# Patient Record
Sex: FEMALE | Race: BLACK OR AFRICAN AMERICAN | NOT HISPANIC OR LATINO | URBAN - METROPOLITAN AREA
[De-identification: names, ages, dates, MRNs, and addresses within clinical notes are randomized per-mention and may not be internally consistent; named-entity substitution may affect disease eponyms.]

---

## 2023-09-21 ENCOUNTER — EMERGENCY (EMERGENCY)
Facility: HOSPITAL | Age: 53
LOS: 1 days | Discharge: HOME | End: 2023-09-21
Attending: EMERGENCY MEDICINE | Admitting: EMERGENCY MEDICINE
Payer: MEDICAID

## 2023-09-21 VITALS
RESPIRATION RATE: 18 BRPM | TEMPERATURE: 99 F | HEART RATE: 88 BPM | OXYGEN SATURATION: 100 % | DIASTOLIC BLOOD PRESSURE: 92 MMHG | SYSTOLIC BLOOD PRESSURE: 154 MMHG

## 2023-09-21 LAB
ALBUMIN SERPL ELPH-MCNC: 4.7 G/DL — SIGNIFICANT CHANGE UP (ref 3.3–5)
ALP SERPL-CCNC: 69 U/L — SIGNIFICANT CHANGE UP (ref 40–120)
ALT FLD-CCNC: 15 U/L — SIGNIFICANT CHANGE UP (ref 4–33)
ANION GAP SERPL CALC-SCNC: 12 MMOL/L — SIGNIFICANT CHANGE UP (ref 7–14)
AST SERPL-CCNC: 18 U/L — SIGNIFICANT CHANGE UP (ref 4–32)
BASOPHILS # BLD AUTO: 0.08 K/UL — SIGNIFICANT CHANGE UP (ref 0–0.2)
BASOPHILS NFR BLD AUTO: 0.9 % — SIGNIFICANT CHANGE UP (ref 0–2)
BILIRUB SERPL-MCNC: 0.4 MG/DL — SIGNIFICANT CHANGE UP (ref 0.2–1.2)
BUN SERPL-MCNC: 8 MG/DL — SIGNIFICANT CHANGE UP (ref 7–23)
CALCIUM SERPL-MCNC: 9.8 MG/DL — SIGNIFICANT CHANGE UP (ref 8.4–10.5)
CHLORIDE SERPL-SCNC: 100 MMOL/L — SIGNIFICANT CHANGE UP (ref 98–107)
CO2 SERPL-SCNC: 28 MMOL/L — SIGNIFICANT CHANGE UP (ref 22–31)
CREAT SERPL-MCNC: 0.69 MG/DL — SIGNIFICANT CHANGE UP (ref 0.5–1.3)
EGFR: 105 ML/MIN/1.73M2 — SIGNIFICANT CHANGE UP
EOSINOPHIL # BLD AUTO: 0.14 K/UL — SIGNIFICANT CHANGE UP (ref 0–0.5)
EOSINOPHIL NFR BLD AUTO: 1.7 % — SIGNIFICANT CHANGE UP (ref 0–6)
GIANT PLATELETS BLD QL SMEAR: PRESENT — SIGNIFICANT CHANGE UP
GLUCOSE SERPL-MCNC: 83 MG/DL — SIGNIFICANT CHANGE UP (ref 70–99)
HCG SERPL-ACNC: <1 MIU/ML — SIGNIFICANT CHANGE UP
HCT VFR BLD CALC: 36.6 % — SIGNIFICANT CHANGE UP (ref 34.5–45)
HGB BLD-MCNC: 11.9 G/DL — SIGNIFICANT CHANGE UP (ref 11.5–15.5)
IANC: 4.94 K/UL — SIGNIFICANT CHANGE UP (ref 1.8–7.4)
INR BLD: 1.04 RATIO — SIGNIFICANT CHANGE UP (ref 0.85–1.18)
LYMPHOCYTES # BLD AUTO: 2.14 K/UL — SIGNIFICANT CHANGE UP (ref 1–3.3)
LYMPHOCYTES # BLD AUTO: 25.2 % — SIGNIFICANT CHANGE UP (ref 13–44)
MANUAL SMEAR VERIFICATION: SIGNIFICANT CHANGE UP
MCHC RBC-ENTMCNC: 27.2 PG — SIGNIFICANT CHANGE UP (ref 27–34)
MCHC RBC-ENTMCNC: 32.5 GM/DL — SIGNIFICANT CHANGE UP (ref 32–36)
MCV RBC AUTO: 83.6 FL — SIGNIFICANT CHANGE UP (ref 80–100)
MONOCYTES # BLD AUTO: 0.36 K/UL — SIGNIFICANT CHANGE UP (ref 0–0.9)
MONOCYTES NFR BLD AUTO: 4.3 % — SIGNIFICANT CHANGE UP (ref 2–14)
NEUTROPHILS # BLD AUTO: 5.46 K/UL — SIGNIFICANT CHANGE UP (ref 1.8–7.4)
NEUTROPHILS NFR BLD AUTO: 64.4 % — SIGNIFICANT CHANGE UP (ref 43–77)
PLAT MORPH BLD: NORMAL — SIGNIFICANT CHANGE UP
PLATELET # BLD AUTO: 338 K/UL — SIGNIFICANT CHANGE UP (ref 150–400)
PLATELET COUNT - ESTIMATE: NORMAL — SIGNIFICANT CHANGE UP
POTASSIUM SERPL-MCNC: 3.7 MMOL/L — SIGNIFICANT CHANGE UP (ref 3.5–5.3)
POTASSIUM SERPL-SCNC: 3.7 MMOL/L — SIGNIFICANT CHANGE UP (ref 3.5–5.3)
PROT SERPL-MCNC: 8.6 G/DL — HIGH (ref 6–8.3)
PROTHROM AB SERPL-ACNC: 11.7 SEC — SIGNIFICANT CHANGE UP (ref 9.5–13)
RBC # BLD: 4.38 M/UL — SIGNIFICANT CHANGE UP (ref 3.8–5.2)
RBC # FLD: 13 % — SIGNIFICANT CHANGE UP (ref 10.3–14.5)
RBC BLD AUTO: NORMAL — SIGNIFICANT CHANGE UP
SMUDGE CELLS # BLD: PRESENT — SIGNIFICANT CHANGE UP
SODIUM SERPL-SCNC: 140 MMOL/L — SIGNIFICANT CHANGE UP (ref 135–145)
TROPONIN T, HIGH SENSITIVITY RESULT: <6 NG/L — SIGNIFICANT CHANGE UP
VARIANT LYMPHS # BLD: 3.5 % — SIGNIFICANT CHANGE UP (ref 0–6)
WBC # BLD: 8.48 K/UL — SIGNIFICANT CHANGE UP (ref 3.8–10.5)
WBC # FLD AUTO: 8.48 K/UL — SIGNIFICANT CHANGE UP (ref 3.8–10.5)

## 2023-09-21 PROCEDURE — 71046 X-RAY EXAM CHEST 2 VIEWS: CPT | Mod: 26

## 2023-09-21 PROCEDURE — 99285 EMERGENCY DEPT VISIT HI MDM: CPT

## 2023-09-21 PROCEDURE — 93010 ELECTROCARDIOGRAM REPORT: CPT

## 2023-09-21 RX ORDER — ASPIRIN/CALCIUM CARB/MAGNESIUM 324 MG
162 TABLET ORAL ONCE
Refills: 0 | Status: COMPLETED | OUTPATIENT
Start: 2023-09-21 | End: 2023-09-21

## 2023-09-21 RX ADMIN — Medication 162 MILLIGRAM(S): at 22:42

## 2023-09-21 NOTE — ED PROVIDER NOTE - OBJECTIVE STATEMENT
53-year-old female with a history of hypertension presents to the ER complaining of 4 to 5 days of intermittent palpitations and chest pain.  Patient reports fluctuation in her blood pressure throughout the week blood pressure readings as high as 150s over 90s.  Patient states chest pain is intermittent, nonexertional, midsternal, non-radiating.  Patient denies fevers, chills, cough, shortness of breath, weakness, dizziness.

## 2023-09-21 NOTE — ED ADULT NURSE NOTE - OBJECTIVE STATEMENT
52 y/o F presents to ED intake A&Ox4 c/o palpitations x 4 days. generalized chest discomfort with associated nausea. respirations even and unlabored. denies HA, v/d, weakness, chills, fevers, SOB. PMHx HTN (enaripril). no acute distress noted. 20G to LAC, labs drawn and sent. medicated as per MD orders. awaiting XR

## 2023-09-21 NOTE — ED PROVIDER NOTE - CLINICAL SUMMARY MEDICAL DECISION MAKING FREE TEXT BOX
53-year-old female with a history of hypertension presents to the ER complaining of 4 to 5 days of intermittent palpitations and chest pain.  Patient reports fluctuation in her blood pressure throughout the week blood pressure readings as high as 150s over 90s.  Pt is well appearing, NAD, will obtain labs, EKG, CXR, possible CDU for cardiac eval.

## 2023-09-22 VITALS
SYSTOLIC BLOOD PRESSURE: 113 MMHG | OXYGEN SATURATION: 99 % | DIASTOLIC BLOOD PRESSURE: 65 MMHG | RESPIRATION RATE: 18 BRPM | HEART RATE: 80 BPM | TEMPERATURE: 99 F

## 2023-09-22 DIAGNOSIS — Z90.49 ACQUIRED ABSENCE OF OTHER SPECIFIED PARTS OF DIGESTIVE TRACT: Chronic | ICD-10-CM

## 2023-09-22 DIAGNOSIS — Z98.891 HISTORY OF UTERINE SCAR FROM PREVIOUS SURGERY: Chronic | ICD-10-CM

## 2023-09-22 DIAGNOSIS — Z98.890 OTHER SPECIFIED POSTPROCEDURAL STATES: Chronic | ICD-10-CM

## 2023-09-22 LAB
CHOLEST SERPL-MCNC: 172 MG/DL — SIGNIFICANT CHANGE UP
HDLC SERPL-MCNC: 34 MG/DL — LOW
LIPID PNL WITH DIRECT LDL SERPL: 96 MG/DL — SIGNIFICANT CHANGE UP
NON HDL CHOLESTEROL: 138 MG/DL — HIGH
TRIGL SERPL-MCNC: 210 MG/DL — HIGH
TROPONIN T, HIGH SENSITIVITY RESULT: <6 NG/L — SIGNIFICANT CHANGE UP

## 2023-09-22 PROCEDURE — 93306 TTE W/DOPPLER COMPLETE: CPT | Mod: 26

## 2023-09-22 PROCEDURE — 78451 HT MUSCLE IMAGE SPECT SING: CPT | Mod: 26,MF

## 2023-09-22 PROCEDURE — G1004: CPT

## 2023-09-22 PROCEDURE — 99236 HOSP IP/OBS SAME DATE HI 85: CPT

## 2023-09-22 PROCEDURE — 93018 CV STRESS TEST I&R ONLY: CPT | Mod: GC,MF

## 2023-09-22 PROCEDURE — 93016 CV STRESS TEST SUPVJ ONLY: CPT | Mod: GC,MF

## 2023-09-22 RX ORDER — LANOLIN ALCOHOL/MO/W.PET/CERES
1 CREAM (GRAM) TOPICAL
Qty: 1 | Refills: 0
Start: 2023-09-22 | End: 2023-10-05

## 2023-09-22 RX ORDER — FERROUS SULFATE 325(65) MG
325 TABLET ORAL
Refills: 0 | Status: DISCONTINUED | OUTPATIENT
Start: 2023-09-22 | End: 2023-09-25

## 2023-09-22 RX ORDER — DIPHENHYDRAMINE HCL 50 MG
50 CAPSULE ORAL ONCE
Refills: 0 | Status: COMPLETED | OUTPATIENT
Start: 2023-09-22 | End: 2023-09-22

## 2023-09-22 RX ORDER — ASPIRIN/CALCIUM CARB/MAGNESIUM 324 MG
81 TABLET ORAL DAILY
Refills: 0 | Status: DISCONTINUED | OUTPATIENT
Start: 2023-09-22 | End: 2023-09-25

## 2023-09-22 RX ORDER — VALACYCLOVIR 500 MG/1
500 TABLET, FILM COATED ORAL
Refills: 0 | Status: DISCONTINUED | OUTPATIENT
Start: 2023-09-22 | End: 2023-09-25

## 2023-09-22 RX ADMIN — Medication 50 MILLIGRAM(S): at 02:42

## 2023-09-22 RX ADMIN — Medication 10 MILLIGRAM(S): at 07:58

## 2023-09-22 RX ADMIN — VALACYCLOVIR 500 MILLIGRAM(S): 500 TABLET, FILM COATED ORAL at 07:59

## 2023-09-22 RX ADMIN — Medication 325 MILLIGRAM(S): at 07:59

## 2023-09-22 RX ADMIN — Medication 81 MILLIGRAM(S): at 11:57

## 2023-09-22 RX ADMIN — Medication 1 TABLET(S): at 07:58

## 2023-09-22 NOTE — ED CDU PROVIDER DISPOSITION NOTE - CARE PROVIDER_API CALL
Mathew Vazquez.  Internal Medicine  [address] [place] [address]  Phone: [phone]  Fax: [phone]  Follow Up Time: Routine

## 2023-09-22 NOTE — CONSULT NOTE ADULT - SUBJECTIVE AND OBJECTIVE BOX
Cardiovascular Disease Initial Evaluation  Date of Service: 23 @ 10:12    CHIEF COMPLAINT: Palpitations    HISTORY OF PRESENT ILLNESS:    This is a 53 year old woman with LVH and hypertension who presented to [facility] on 2023 complaining of 4 to 5 days of intermittent palpitations and chest pain.  Patient reports fluctuation in her blood pressure throughout the week blood pressure readings as high as 150s over 90s.  Patient states chest pain is intermittent, nonexertional, midsternal, non-radiating.  Patient denies fevers, chills, cough, shortness of breath, weakness, dizziness.    Allergies  No Known Allergies        MEDICATIONS:  aspirin enteric coated 81 milliGRAM(s) Oral daily  enalapril 10 milliGRAM(s) Oral <User Schedule>    valACYclovir 500 milliGRAM(s) Oral <User Schedule>            ferrous    sulfate 325 milliGRAM(s) Oral <User Schedule>  multivitamin 1 Tablet(s) Oral <User Schedule>      PAST MEDICAL & SURGICAL HISTORY:  HTN (hypertension)      H/O herpes genitalis      S/P       S/P cholecystectomy          FAMILY HISTORY:  No pertinent family history in first degree relatives        SOCIAL HISTORY:    The patient is a nonsmoker       REVIEW OF SYSTEMS:  See HPI, otherwise complete 14 point review of systems negative        PHYSICAL EXAM:  T(C): 37.1 (23 @ 06:00), Max: 37.1 (23 @ 20:39)  HR: 80 (23 @ 07:43) (75 - 88)  BP: 136/84 (23 @ 07:43) (135/83 - 154/92)  RR: 18 (23 @ 06:00) (18 - 18)  SpO2: 99% (23 @ 06:00) (99% - 100%)  Wt(kg): --  I&O's Summary      Appearance: No Acute Distress; resting comfortably  HEENT:  Normal oral mucosa, PERRL, EOMI	  Cardiovascular: Normal S1 S2, No JVD, No murmurs/rubs/gallops  Respiratory: Normal respiratory effort; Lungs clear to auscultation bilaterally  Gastrointestinal:  Soft, Non-tender, + BS	  Skin: No rashes, No ecchymoses, No cyanosis	  Neurologic: Non-focal; no weakness  Extremities: No clubbing, cyanosis or edema  Vascular: Peripheral pulses palpable 2+ bilaterally  Psychiatry: A & O x 3, Mood & affect appropriate    Laboratory Data:	 	    CBC Full  -  ( 21 Sep 2023 22:48 )  WBC Count : 8.48 K/uL  Hemoglobin : 11.9 g/dL  Hematocrit : 36.6 %  Platelet Count - Automated : 338 K/uL  Mean Cell Volume : 83.6 fL  Mean Cell Hemoglobin : 27.2 pg  Mean Cell Hemoglobin Concentration : 32.5 gm/dL  Auto Neutrophil # : 5.46 K/uL  Auto Lymphocyte # : 2.14 K/uL  Auto Monocyte # : 0.36 K/uL  Auto Eosinophil # : 0.14 K/uL  Auto Basophil # : 0.08 K/uL  Auto Neutrophil % : 64.4 %  Auto Lymphocyte % : 25.2 %  Auto Monocyte % : 4.3 %  Auto Eosinophil % : 1.7 %  Auto Basophil % : 0.9 %        140  |  100  |  8   ----------------------------<  83  3.7   |  28  |  0.69    Ca    9.8      21 Sep 2023 22:48    TPro  8.6<H>  /  Alb  4.7  /  TBili  0.4  /  DBili  x   /  AST  18  /  ALT  15  /  AlkPhos  69        Interpretation of Telemetry: Sinus; no ectopy	    ECG:  	Sinus; LVH    Assessment:  53 year old woman with LVH and hypertension presents with intermittent palpitations and chest pain.    Plan of Care:    #Chest pain-  Ms. Goldne has ruled out for ACS and EKG is nonischemic.   Echo is unremarkable.  Awaiting NST, as ordered by CDU.    #HTN-  No evidence of hypertensive cardiomyopathy on echo.  BP is controlled.      No objection to discharge if stress test is negative for inducible ischemia.       42 minutes spent on total encounter; more than 50% of the visit was spent counseling and/or coordinating care by the attending physician.   	  Mathew Vazquez MD [facility]  Cardiovascular Diseases  [phone]

## 2023-09-22 NOTE — ED CDU PROVIDER DISPOSITION NOTE - CLINICAL COURSE
54 yo female, PMH HTN (on enalapril 10 BID) and herpes (on valtrex 500 BID), presenting to the ED c/o intermittent palpitations and intermittent midsternal non-radiating chest pain x 4 -5 days, along w/ intermittent elevations in BP to 150s/90s.    In the ED, VSS, pt afebrile.  EKG NSR with no acute/ischemic morphology noted.  CBC/coags/CMP unremarkable.  Troponin <6 ---> <6.  HCG <1.0.  CXR was performed; per prelim radiology report: "...IMPRESSION: Clear lungs."; official radiology report is pending.  Pt was sent to CDU for continued care plan:  Tele monitoring, stress test, echo, Tele Doc of Day evaluation, general observation care / monitoring. Pt seen and evaluated by Dr. Vazquez, cardio, ECHO wnl, normal EF, NO RWMA, NST negative for inducible ischemia. no ischemic events on tele. pt updated on all results/findings. also endorsed difficulty sleeping at night, given melatonin and also recommended outpatient crisis center clinic. pt verbalized understanding and is in agreement with plan. Return precautions discussed.

## 2023-09-22 NOTE — ED CDU PROVIDER DISPOSITION NOTE - NSFOLLOWUPINSTRUCTIONS_ED_ALL_ED_FT
You were seen in our Emergency Department for chest pain.  Continue your home medications.  Start taking melatonin, 1 tablet nightly, to help you sleep.  Follow up with your PMD in 48-72 hours.   Follow up with your Cardiologist in 48-72 hours (see referral list provided) for further outpatient cardiac workup.  Bring copies of all paperwork/results to your doctor.  If you develop worsening pain, shortness of breath, dizziness, palpitations, rectal bleeding, numbness, tingling, weakness or any worsening symptoms return to our ED for evaluation.

## 2023-09-22 NOTE — ED CDU PROVIDER DISPOSITION NOTE - PATIENT PORTAL LINK FT
You can access the FollowMyHealth Patient Portal offered by [facility] by registering at the following website: [email or web address]. By joining [facility]’s FollowMyHealth portal, you will also be able to view your health information using other applications (apps) compatible with our system.

## 2023-09-22 NOTE — ED CDU PROVIDER INITIAL DAY NOTE - NSICDXPASTMEDICALHX_GEN_ALL_CORE_FT
PAST MEDICAL HISTORY:  HTN (hypertension)      PAST MEDICAL HISTORY:  H/O herpes genitalis     HTN (hypertension)

## 2023-09-22 NOTE — ED CDU PROVIDER DISPOSITION NOTE - ATTENDING APP SHARED VISIT CONTRIBUTION OF CARE
53-year-old female from [place] with a history of hypertension presents to the ER complaining of 4 to 5 days of intermittent palpitations  Patient reports fluctuation in her blood pressure throughout the week blood pressure readings - states just recently started taking her BP meds again. States she also has not been sleeping well attributes this to difficulty falling asleep after waking back up and difficulty falling asleep at night because of worries. Patient states that she has never seen anyone for this.  She denies any SI, HI.  Patient states that she will be in [place] until December.    Patient states her symptoms have resolved.  Her echo and stress test are unremarkable.  Patient will follow-up with cardiology.  We also did give resources for Dr. As she did states she would be open to speaking with someone about her feelings of worry.  We did tell her she can try melatonin for sleep as well.  Patient to resume her blood pressure medications as outpatient.     Patient was given strict return precautions.    General: Well appearing, well nourished, in no distress  Head: Normocephalic, atraumatic  Eyes: Conjunctiva clear, sclera non-icteric, EOM intact, PERRL  Mouth: Mucous membranes moist, no mucosal lesions.  Neck: Supple  Heart: Regular rate and rhythm, no murmur or gallop  Lungs: Clear to auscultation  Abdomen: Bowel sounds present, soft, no tenderness, non distended, no organomegaly, masses  Back: Spine normal without deformity or tenderness, no CVA tenderness  Extremities: No amputations or deformities, cyanosis, edema  Musculoskeletal: No crepitation, defects or decreased range of motion, strength intact throughout, pulses intact  Neurologic: No gross deficits CN II-XII intact Sensation intact  Psychiatric: Oriented X3, normal mood and affect  Skin: Warm,dry. Good turgor, no rash, unusual bruising, Cap refill <2 seconds 53-year-old female from [place] with a history of hypertension presents to the ER complaining of 4 to 5 days of intermittent palpitations  Patient reports fluctuation in her blood pressure throughout the week blood pressure readings - states just recently started taking her BP meds again. States she also has not been sleeping well attributes this to difficulty falling asleep after waking back up and difficulty falling asleep at night because of worries. Patient states that she has never seen anyone for this.  She denies any SI, HI.  Patient states that she will be in [place] until December.    Patient states her symptoms have resolved.  Her echo and stress test are unremarkable.  Patient will follow-up with cardiology.  We also did give resources for Adin as she did states she would be open to speaking with someone about her feelings of worry.  We did tell her she can try melatonin for sleep as well.  Patient to resume her blood pressure medications as outpatient.     Patient was given strict return precautions.    General: Well appearing, well nourished, in no distress  Head: Normocephalic, atraumatic  Eyes: Conjunctiva clear, sclera non-icteric, EOM intact, PERRL  Mouth: Mucous membranes moist, no mucosal lesions.  Neck: Supple  Heart: Regular rate and rhythm, no murmur or gallop  Lungs: Clear to auscultation  Abdomen: Bowel sounds present, soft, no tenderness, non distended, no organomegaly, masses  Back: Spine normal without deformity or tenderness, no CVA tenderness  Extremities: No amputations or deformities, cyanosis, edema  Musculoskeletal: No crepitation, defects or decreased range of motion, strength intact throughout, pulses intact  Neurologic: No gross deficits CN II-XII intact Sensation intact  Psychiatric: Oriented X3, normal mood and affect  Skin: Warm,dry. Good turgor, no rash, unusual bruising, Cap refill <2 seconds

## 2023-09-22 NOTE — ED ADULT NURSE REASSESSMENT NOTE - NS ED NURSE REASSESS COMMENT FT1
Pt appears to be alert and awake, respirations are even and unlabored, NAD, denies any complaints at this moment, Denies cp, sob, palpitations, dizziness, lightheadedness, n/v/d, fever, chills, cough, HA, blurry vision. Pending lab results, Safety precautions implemented as per protocol, awaiting further MD orders, plan of care ongoing.

## 2023-09-22 NOTE — ED CDU PROVIDER DISPOSITION NOTE - NS ED ATTENDING STATEMENT MOD
This was a shared visit with the ROBBIE. I reviewed and verified the documentation and independently performed the documented:

## 2023-09-22 NOTE — ED CDU PROVIDER INITIAL DAY NOTE - OBJECTIVE STATEMENT
53-year-old female with a history of hypertension presents to the ER complaining of 4 to 5 days of intermittent palpitations and chest pain.  Patient reports fluctuation in her blood pressure throughout the week blood pressure readings as high as 150s over 90s.  Patient states chest pain is intermittent, nonexertional, midsternal, non-radiating.  Patient denies fevers, chills, cough, shortness of breath, weakness, dizziness.    CDU RIA Malik Note----  ED Provider HPI as above, reviewed.  Pt is a 54 yo female, PMH HTN (on enalapril 10 BID) and herpes (on valtrex 500 BID), presenting to the ED c/o intermittent palpitations and intermittent midsternal non-radiating chest pain x 4 -5 days, along w/ intermittent elevations in BP to 150s/90s.    In the ED, VSS, pt afebrile.  EKG NSR with no acute/ischemic morphology noted.  CBC/coags/CMP unremarkable.  Troponin <6 ---> <6.  HCG <1.0.  CXR 53-year-old female with a history of hypertension presents to the ER complaining of 4 to 5 days of intermittent palpitations and chest pain.  Patient reports fluctuation in her blood pressure throughout the week blood pressure readings as high as 150s over 90s.  Patient states chest pain is intermittent, nonexertional, midsternal, non-radiating.  Patient denies fevers, chills, cough, shortness of breath, weakness, dizziness.    CDU RIA Malik Note----  ED Provider HPI as above, reviewed.  Pt is a 54 yo female, PMH HTN (on enalapril 10 BID) and herpes (on valtrex 500 BID), presenting to the ED c/o intermittent palpitations and intermittent midsternal non-radiating chest pain x 4 -5 days, along w/ intermittent elevations in BP to 150s/90s.    In the ED, VSS, pt afebrile.  EKG NSR with no acute/ischemic morphology noted.  CBC/coags/CMP unremarkable.  Troponin <6 ---> <6.  HCG <1.0.  CXR was performed; per prelim radiology report: "...IMPRESSION: Clear lungs."; official radiology report is pending.  Pt was sent to CDU for continued care plan:  Tele monitoring, stress test, echo, Tele Doc of Day evaluation, general observation care / monitoring.

## 2023-09-22 NOTE — ED CDU PROVIDER INITIAL DAY NOTE - CLINICAL SUMMARY MEDICAL DECISION MAKING FREE TEXT BOX
Tele monitoring, stress test, echo, Tele Doc of Day evaluation, general observation care / monitoring. Gaston att: Tele monitoring, stress test, echo, Tele Doc of Day evaluation, general observation care / monitoring.

## 2023-09-23 ENCOUNTER — EMERGENCY (EMERGENCY)
Facility: HOSPITAL | Age: 53
LOS: 1 days | Discharge: HOME | End: 2023-09-23
Attending: EMERGENCY MEDICINE | Admitting: EMERGENCY MEDICINE
Payer: SELF-PAY

## 2023-09-23 VITALS
RESPIRATION RATE: 18 BRPM | HEART RATE: 80 BPM | OXYGEN SATURATION: 100 % | SYSTOLIC BLOOD PRESSURE: 156 MMHG | DIASTOLIC BLOOD PRESSURE: 90 MMHG | TEMPERATURE: 98 F

## 2023-09-23 VITALS
HEART RATE: 76 BPM | DIASTOLIC BLOOD PRESSURE: 86 MMHG | OXYGEN SATURATION: 100 % | RESPIRATION RATE: 15 BRPM | SYSTOLIC BLOOD PRESSURE: 145 MMHG | TEMPERATURE: 99 F

## 2023-09-23 DIAGNOSIS — Z90.49 ACQUIRED ABSENCE OF OTHER SPECIFIED PARTS OF DIGESTIVE TRACT: Chronic | ICD-10-CM

## 2023-09-23 DIAGNOSIS — Z98.891 HISTORY OF UTERINE SCAR FROM PREVIOUS SURGERY: Chronic | ICD-10-CM

## 2023-09-23 PROBLEM — Z86.19 PERSONAL HISTORY OF OTHER INFECTIOUS AND PARASITIC DISEASES: Chronic | Status: ACTIVE | Noted: 2023-09-22

## 2023-09-23 PROBLEM — I10 ESSENTIAL (PRIMARY) HYPERTENSION: Chronic | Status: ACTIVE | Noted: 2023-09-22

## 2023-09-23 LAB
ALBUMIN SERPL ELPH-MCNC: 4.2 G/DL — SIGNIFICANT CHANGE UP (ref 3.3–5)
ALP SERPL-CCNC: 60 U/L — SIGNIFICANT CHANGE UP (ref 40–120)
ALT FLD-CCNC: 14 U/L — SIGNIFICANT CHANGE UP (ref 4–33)
ANION GAP SERPL CALC-SCNC: 12 MMOL/L — SIGNIFICANT CHANGE UP (ref 7–14)
AST SERPL-CCNC: 22 U/L — SIGNIFICANT CHANGE UP (ref 4–32)
BASOPHILS # BLD AUTO: 0.03 K/UL — SIGNIFICANT CHANGE UP (ref 0–0.2)
BASOPHILS NFR BLD AUTO: 0.4 % — SIGNIFICANT CHANGE UP (ref 0–2)
BILIRUB SERPL-MCNC: <0.2 MG/DL — SIGNIFICANT CHANGE UP (ref 0.2–1.2)
BUN SERPL-MCNC: 15 MG/DL — SIGNIFICANT CHANGE UP (ref 7–23)
CALCIUM SERPL-MCNC: 9.5 MG/DL — SIGNIFICANT CHANGE UP (ref 8.4–10.5)
CHLORIDE SERPL-SCNC: 103 MMOL/L — SIGNIFICANT CHANGE UP (ref 98–107)
CO2 SERPL-SCNC: 25 MMOL/L — SIGNIFICANT CHANGE UP (ref 22–31)
CREAT SERPL-MCNC: 0.64 MG/DL — SIGNIFICANT CHANGE UP (ref 0.5–1.3)
EGFR: 107 ML/MIN/1.73M2 — SIGNIFICANT CHANGE UP
EOSINOPHIL # BLD AUTO: 0.18 K/UL — SIGNIFICANT CHANGE UP (ref 0–0.5)
EOSINOPHIL NFR BLD AUTO: 2.5 % — SIGNIFICANT CHANGE UP (ref 0–6)
FLUAV AG NPH QL: SIGNIFICANT CHANGE UP
FLUBV AG NPH QL: SIGNIFICANT CHANGE UP
GLUCOSE SERPL-MCNC: 99 MG/DL — SIGNIFICANT CHANGE UP (ref 70–99)
HCT VFR BLD CALC: 35.3 % — SIGNIFICANT CHANGE UP (ref 34.5–45)
HGB BLD-MCNC: 11.3 G/DL — LOW (ref 11.5–15.5)
IANC: 4.29 K/UL — SIGNIFICANT CHANGE UP (ref 1.8–7.4)
IMM GRANULOCYTES NFR BLD AUTO: 0.3 % — SIGNIFICANT CHANGE UP (ref 0–0.9)
LYMPHOCYTES # BLD AUTO: 2.44 K/UL — SIGNIFICANT CHANGE UP (ref 1–3.3)
LYMPHOCYTES # BLD AUTO: 33.4 % — SIGNIFICANT CHANGE UP (ref 13–44)
MAGNESIUM SERPL-MCNC: 2.2 MG/DL — SIGNIFICANT CHANGE UP (ref 1.6–2.6)
MCHC RBC-ENTMCNC: 27.4 PG — SIGNIFICANT CHANGE UP (ref 27–34)
MCHC RBC-ENTMCNC: 32 GM/DL — SIGNIFICANT CHANGE UP (ref 32–36)
MCV RBC AUTO: 85.7 FL — SIGNIFICANT CHANGE UP (ref 80–100)
MONOCYTES # BLD AUTO: 0.35 K/UL — SIGNIFICANT CHANGE UP (ref 0–0.9)
MONOCYTES NFR BLD AUTO: 4.8 % — SIGNIFICANT CHANGE UP (ref 2–14)
NEUTROPHILS # BLD AUTO: 4.29 K/UL — SIGNIFICANT CHANGE UP (ref 1.8–7.4)
NEUTROPHILS NFR BLD AUTO: 58.6 % — SIGNIFICANT CHANGE UP (ref 43–77)
NRBC # BLD: 0 /100 WBCS — SIGNIFICANT CHANGE UP (ref 0–0)
NRBC # FLD: 0 K/UL — SIGNIFICANT CHANGE UP (ref 0–0)
PHOSPHATE SERPL-MCNC: 3 MG/DL — SIGNIFICANT CHANGE UP (ref 2.5–4.5)
PLATELET # BLD AUTO: 313 K/UL — SIGNIFICANT CHANGE UP (ref 150–400)
POTASSIUM SERPL-MCNC: 4.3 MMOL/L — SIGNIFICANT CHANGE UP (ref 3.5–5.3)
POTASSIUM SERPL-SCNC: 4.3 MMOL/L — SIGNIFICANT CHANGE UP (ref 3.5–5.3)
PROT SERPL-MCNC: 7.5 G/DL — SIGNIFICANT CHANGE UP (ref 6–8.3)
RBC # BLD: 4.12 M/UL — SIGNIFICANT CHANGE UP (ref 3.8–5.2)
RBC # FLD: 13.2 % — SIGNIFICANT CHANGE UP (ref 10.3–14.5)
RSV RNA NPH QL NAA+NON-PROBE: SIGNIFICANT CHANGE UP
SARS-COV-2 RNA SPEC QL NAA+PROBE: SIGNIFICANT CHANGE UP
SODIUM SERPL-SCNC: 140 MMOL/L — SIGNIFICANT CHANGE UP (ref 135–145)
TROPONIN T, HIGH SENSITIVITY RESULT: <6 NG/L — SIGNIFICANT CHANGE UP
WBC # BLD: 7.31 K/UL — SIGNIFICANT CHANGE UP (ref 3.8–10.5)
WBC # FLD AUTO: 7.31 K/UL — SIGNIFICANT CHANGE UP (ref 3.8–10.5)

## 2023-09-23 PROCEDURE — 70496 CT ANGIOGRAPHY HEAD: CPT | Mod: 26,MA

## 2023-09-23 PROCEDURE — 70498 CT ANGIOGRAPHY NECK: CPT | Mod: 26,MA

## 2023-09-23 PROCEDURE — 99285 EMERGENCY DEPT VISIT HI MDM: CPT

## 2023-09-23 PROCEDURE — 71045 X-RAY EXAM CHEST 1 VIEW: CPT | Mod: 26

## 2023-09-23 PROCEDURE — 93010 ELECTROCARDIOGRAM REPORT: CPT

## 2023-09-23 RX ORDER — MECLIZINE HCL 12.5 MG
1 TABLET ORAL
Qty: 42 | Refills: 0
Start: 2023-09-23 | End: 2023-10-06

## 2023-09-23 RX ORDER — SODIUM CHLORIDE 9 MG/ML
1000 INJECTION, SOLUTION INTRAVENOUS ONCE
Refills: 0 | Status: COMPLETED | OUTPATIENT
Start: 2023-09-23 | End: 2023-09-23

## 2023-09-23 RX ORDER — METOCLOPRAMIDE HCL 10 MG
10 TABLET ORAL ONCE
Refills: 0 | Status: COMPLETED | OUTPATIENT
Start: 2023-09-23 | End: 2023-09-23

## 2023-09-23 RX ADMIN — SODIUM CHLORIDE 1000 MILLILITER(S): 9 INJECTION, SOLUTION INTRAVENOUS at 04:35

## 2023-09-23 NOTE — ED ADULT NURSE REASSESSMENT NOTE - NS ED NURSE REASSESS COMMENT FT1
Pt at baseline mental status, breathing even and unlabored in bed. Pt denied Reglan because she "feels better and wants to go home". MD made aware, pt to be discharged.

## 2023-09-23 NOTE — ED PROVIDER NOTE - PATIENT PORTAL LINK FT
You can access the FollowMyHealth Patient Portal offered by [facility] by registering at the following website: [email or web address]. By joining [email or web address]’s FollowMyHealth portal, you will also be able to view your health information using other applications (apps) compatible with our system.

## 2023-09-23 NOTE — ED PROVIDER NOTE - NSFOLLOWUPINSTRUCTIONS_ED_ALL_ED_FT
You were seen in the Emergency Department for dizziness.     1) Advance activity as tolerated.   2) Continue all previously prescribed medications as directed.    3) Follow up with your primary care physician in 24-48 hours - take copies of your results.    4) Return to the Emergency Department for worsening or persistent symptoms, and/or ANY NEW OR CONCERNING SYMPTOMS.    Please follow up with the ear nose and throat doctor to evaluate your dizziness.     stay well hydrated and get adequate sleep.     take the meclizine as needed for dizziness at home.     seek immediate medical attention or return to the ER for any vision loss, weakness in one or more extremities, head trauma, inability to walk, or any other new or concerning symptoms.

## 2023-09-23 NOTE — ED PROVIDER NOTE - CLINICAL SUMMARY MEDICAL DECISION MAKING FREE TEXT BOX
53-year-old female history of HTN, herpes, s/p cholecystectomy, s/p  presents with right-sided facial numbness, dizziness fatigue.  Patient dizziness and right-sided facial numbness at approximately 10 PM last night, feels like room is spinning, numbness in the right forehead and right cheek associated with total body fatigue and mild headache.  Denies any previous similar episodes.  Dizziness not affected by positional change.  Denies fever/chills, CP, SOB, abd pain, N/V/D, dysuria. AVSS, ANO 3, + decreased sensation on right side of face V1 V2 distribution, remaining CN intact, no focal motor deficits, normal gait, EOMI, + horizontal nystagmus, lungs CTA B/L, RRR, no murmurs, no peripheral edema, abdomen soft, ND NT.  Patient with vertigo, and focal neuro deficits, concerning for potential central cause of vertigo, less likely peripheral, possible migraine, versus potential infectious etiology such as URI, however less likely given lack of additional practice symptoms and fever and chills.  We will get labs, EKG, CTA head and neck, give meds and reassess.

## 2023-09-23 NOTE — ED ADULT NURSE NOTE - OBJECTIVE STATEMENT
Pt arrives to the ED aaox4, ambulatory, breathing even and unlabored in bed. Pt reports having an increase in dizziness the past few days making her unable to sleep. Pt states when she lays down the dizziness increases and she feels like she will faint. Pt states she is experiencing tingling on the right side of her face with jaw pains. Pt is more sensitive to touch on the right side of her face, equal  strength, no facial droop noted. Pt denies chest pain, SOB, headache, blurry vision, chills. Bed in lowest position, call bell within reach, all safety precautions in place.

## 2023-09-23 NOTE — ED PROVIDER NOTE - ATTENDING CONTRIBUTION TO CARE
I performed a face-to-face evaluation of the patient and performed a history and physical examination along with the resident or ACP, and/or medical student above.  I agree with the history and physical examination as documented by the resident or ACP, and/or medical student above.  Stiles:  GENERAL: well appearing in no acute distress, non-toxic appearing  HEENT: +oral mucosa dry  CARDIAC: regular rate and rhythm, normal S1S2, no appreciable murmurs  PULM: normal breath sounds, clear to ascultation bilaterally, no rales, rhonchi, wheezing  GI: Abd soft, nondistended, nontender  NEURO: +decreased sensation in R V1/V2 distribution, no focal motor deficits, CN2-12 other than R V1/V2 sensation intact, normal speech, PERRLA, EOMI, +horizontal nystagmus, normal gait, AAOx3  MSK: no peripheral edema, no calf tenderness b/l  SKIN: well-perfused, extremities warm, no visible rashes

## 2023-09-23 NOTE — ED PROVIDER NOTE - PROGRESS NOTE DETAILS
Rubi Falcon (Rodriguez) PGY3 pt offered reglan after CTAs came back negative, however she declines as her symptoms are resolved and she prefers to go home at this time.

## 2023-09-23 NOTE — ED PROVIDER NOTE - OBJECTIVE STATEMENT
53-year-old female history of HTN, herpes, s/p cholecystectomy, s/p  presents with right-sided facial numbness, dizziness fatigue.  Patient dizziness and right-sided facial numbness at approximately 10 PM last night, feels like room is spinning, numbness in the right forehead and right cheek associated with total body fatigue and mild headache.  Denies any previous similar episodes.  Dizziness not affected by positional change.  Denies fever/chills, CP, SOB, abd pain, N/V/D, dysuria.

## 2023-09-23 NOTE — ED ADULT NURSE NOTE - CHIEF COMPLAINT QUOTE
Pt. c/o numbness to right side of head that began at 10 p.m. endorses head-spinning. no weakness, arm or leg drift noted. No facial droop or slurred speech noted. Denies vision [id number]. MD Stiles called for code stroke evaluation. No code stroke called.

## 2023-09-23 NOTE — ED ADULT TRIAGE NOTE - CHIEF COMPLAINT QUOTE
Pt. c/o numbness to right side of head that began at 10 p.m. endorses head-spinning. no weakness, arm or leg drift noted. No facial droop or slurred speech noted. Denies vision [id number]. MD Stiles called for code stroke evaluation. No code stroke called.

## 2023-09-23 NOTE — ED PROVIDER NOTE - PHYSICAL EXAMINATION
GENERAL: well appearing in no acute distress, non-toxic appearing  HEENT: +oral mucosa dry  CARDIAC: regular rate and rhythm, normal S1S2, no appreciable murmurs  PULM: normal breath sounds, clear to ascultation bilaterally, no rales, rhonchi, wheezing  GI: Abd soft, nondistended, nontender  NEURO: +decreased sensation in R V1/V2 distribution, no focal motor deficits, CN2-12 other than R V1/V2 sensation intact, normal speech, PERRLA, EOMI, +horizontal nystagmus, normal gait, AAOx3  MSK: no peripheral edema, no calf tenderness b/l  SKIN: well-perfused, extremities warm, no visible rashes

## 2023-09-27 ENCOUNTER — INPATIENT (INPATIENT)
Facility: HOSPITAL | Age: 53
LOS: 1 days | Discharge: HOME | End: 2023-09-29
Attending: INTERNAL MEDICINE | Admitting: INTERNAL MEDICINE
Payer: MEDICAID

## 2023-09-27 VITALS
OXYGEN SATURATION: 100 % | HEART RATE: 104 BPM | DIASTOLIC BLOOD PRESSURE: 107 MMHG | RESPIRATION RATE: 18 BRPM | SYSTOLIC BLOOD PRESSURE: 161 MMHG | TEMPERATURE: 98 F

## 2023-09-27 DIAGNOSIS — Z90.49 ACQUIRED ABSENCE OF OTHER SPECIFIED PARTS OF DIGESTIVE TRACT: Chronic | ICD-10-CM

## 2023-09-27 DIAGNOSIS — Z98.891 HISTORY OF UTERINE SCAR FROM PREVIOUS SURGERY: Chronic | ICD-10-CM

## 2023-09-27 LAB
ANISOCYTOSIS BLD QL: SLIGHT — SIGNIFICANT CHANGE UP
BASOPHILS # BLD AUTO: 0 K/UL — SIGNIFICANT CHANGE UP (ref 0–0.2)
BASOPHILS NFR BLD AUTO: 0 % — SIGNIFICANT CHANGE UP (ref 0–2)
CK MB BLD-MCNC: 1.1 % — SIGNIFICANT CHANGE UP (ref 0–2.5)
CK MB CFR SERPL CALC: 1.4 NG/ML — SIGNIFICANT CHANGE UP
CK SERPL-CCNC: 125 U/L — SIGNIFICANT CHANGE UP (ref 25–170)
EOSINOPHIL # BLD AUTO: 0.3 K/UL — SIGNIFICANT CHANGE UP (ref 0–0.5)
EOSINOPHIL NFR BLD AUTO: 3.5 % — SIGNIFICANT CHANGE UP (ref 0–6)
GIANT PLATELETS BLD QL SMEAR: PRESENT — SIGNIFICANT CHANGE UP
HCT VFR BLD CALC: 36.4 % — SIGNIFICANT CHANGE UP (ref 34.5–45)
HGB BLD-MCNC: 11.9 G/DL — SIGNIFICANT CHANGE UP (ref 11.5–15.5)
IANC: 5.33 K/UL — SIGNIFICANT CHANGE UP (ref 1.8–7.4)
LYMPHOCYTES # BLD AUTO: 2.59 K/UL — SIGNIFICANT CHANGE UP (ref 1–3.3)
LYMPHOCYTES # BLD AUTO: 30.1 % — SIGNIFICANT CHANGE UP (ref 13–44)
MACROCYTES BLD QL: SLIGHT — SIGNIFICANT CHANGE UP
MANUAL SMEAR VERIFICATION: SIGNIFICANT CHANGE UP
MCHC RBC-ENTMCNC: 27.3 PG — SIGNIFICANT CHANGE UP (ref 27–34)
MCHC RBC-ENTMCNC: 32.7 GM/DL — SIGNIFICANT CHANGE UP (ref 32–36)
MCV RBC AUTO: 83.5 FL — SIGNIFICANT CHANGE UP (ref 80–100)
MONOCYTES # BLD AUTO: 0.38 K/UL — SIGNIFICANT CHANGE UP (ref 0–0.9)
MONOCYTES NFR BLD AUTO: 4.4 % — SIGNIFICANT CHANGE UP (ref 2–14)
NEUTROPHILS # BLD AUTO: 5.19 K/UL — SIGNIFICANT CHANGE UP (ref 1.8–7.4)
NEUTROPHILS NFR BLD AUTO: 60.2 % — SIGNIFICANT CHANGE UP (ref 43–77)
PLAT MORPH BLD: NORMAL — SIGNIFICANT CHANGE UP
PLATELET # BLD AUTO: 369 K/UL — SIGNIFICANT CHANGE UP (ref 150–400)
PLATELET COUNT - ESTIMATE: NORMAL — SIGNIFICANT CHANGE UP
POIKILOCYTOSIS BLD QL AUTO: SLIGHT — SIGNIFICANT CHANGE UP
POLYCHROMASIA BLD QL SMEAR: SLIGHT — SIGNIFICANT CHANGE UP
RBC # BLD: 4.36 M/UL — SIGNIFICANT CHANGE UP (ref 3.8–5.2)
RBC # FLD: 13.5 % — SIGNIFICANT CHANGE UP (ref 10.3–14.5)
RBC BLD AUTO: ABNORMAL
VARIANT LYMPHS # BLD: 1.8 % — SIGNIFICANT CHANGE UP (ref 0–6)
WBC # BLD: 8.62 K/UL — SIGNIFICANT CHANGE UP (ref 3.8–10.5)
WBC # FLD AUTO: 8.62 K/UL — SIGNIFICANT CHANGE UP (ref 3.8–10.5)

## 2023-09-27 PROCEDURE — 99285 EMERGENCY DEPT VISIT HI MDM: CPT

## 2023-09-27 PROCEDURE — 71046 X-RAY EXAM CHEST 2 VIEWS: CPT | Mod: 26

## 2023-09-27 NOTE — ED PROVIDER NOTE - CLINICAL SUMMARY MEDICAL DECISION MAKING FREE TEXT BOX
53-year-old female with history HTN presents to the ED with chest pain for the past 2 hours.  Patient describes her pain as a midsternal non-radiating sharp 10/10 pain.  Patient says she has been having 1 week of high blood pressure and palpitations, 3 days of headaches and chest pain for the past 2 hours.  Patient also endorses left leg.  Patient is not on any anticoagulants.  Patient denies cigarette use, alcohol use or recreational drug use. R/o acs, ptx, aortic dissection. Orders include ekg, trops, chest x ray. Dispo pending labs, imaging and reassessment.

## 2023-09-27 NOTE — ED PROVIDER NOTE - ATTENDING CONTRIBUTION TO CARE
The patient is a 53y Female who has a past medical and surgery history of HTN Herpes cholecystectomy PTED with chest pain same as she presented for yesterday and which prompted admssion and negative workup in the CDU 2 chin ago    Vital Signs Last 24 Hrs  T(F): 98.4 HR: 104 BP: 161/107 RR: 18 SpO2: 100% (27 Sep 2023 21:12) PE: as described; my additions and exceptions are noted in the chart   DATA:  EKG: pending at time of evaluation  LAB: Pending at time of evaluation  Patient to be admitted to complete workup

## 2023-09-27 NOTE — ED PROVIDER NOTE - PHYSICAL EXAMINATION
Const: Awake, alert, no acute distress.  Well appearing.  Moving comfortably on stretcher.  Cardiac: Regular rate and regular rhythm. S1 S2 present.  Peripheral pulses 2+ and symmetric. Mild pitting edema of left lower extremity (1+ piitng edema).   Resp: Speaking in full sentences. No evidence of respiratory distress.  Breath sounds clear to auscultation b/l. Normal chest excursion.   Abd: Non-distended, no overlying skin changes.  Soft, non-tender, no guarding, no rigidity, no rebound tenderness.  No palpable masses.  Normal bowel sounds in all 4 quadrants.  Back: Spine midline and non-tender. No CVAT.  Skin: Normal coloration.  No rashes, abrasions or lacerations.  Neuro: Awake, alert & oriented x 3.  Moves all extremities spontaneously.  No focal deficits.

## 2023-09-27 NOTE — ED PROVIDER NOTE - NS_BEDUNITTYPES_ED_ALL_ED
TELEMETRY Protopic Counseling: Patient may experience a mild burning sensation during topical application. Protopic is not approved in children less than 2 years of age. There have been case reports of hematologic and skin malignancies in patients using topical calcineurin inhibitors although causality is questionable.

## 2023-09-27 NOTE — ED ADULT NURSE NOTE - NSFALLUNIVINTERV_ED_ALL_ED
Bed/Stretcher in lowest position, wheels locked, appropriate side rails in place/Call bell, personal items and telephone in reach/Instruct patient to call for assistance before getting out of bed/chair/stretcher/Non-slip footwear applied when patient is off stretcher/[place] to call system/Physically safe environment - no spills, clutter or unnecessary equipment/Purposeful proactive rounding/Room/bathroom lighting operational, light cord in reach

## 2023-09-27 NOTE — ED ADULT NURSE NOTE - OBJECTIVE STATEMENT
Pt received in rm 6. C/o sudden onset sharp right sided 10/10 chest pain associated with SOB that started around 8PM while she was laying down. Reports pain I currently 5/10. PMHx HTN. Pt was recently seen and evaluated in ED for palpitations, states with neg work-up. A&Ox4, ambulatory. Breathing even and unlabored. NAD. 20G IV on left hand placed by float RN. Labs were drawn and sent. Waiting for labs and XR result Pt received in rm 6. C/o sudden onset sharp right sided 10/10 chest pain associated with SOB that started around 8PM while she was laying down. Reports pain I currently 5/10. PMHx HTN. Pt was recently seen and evaluated in ED for palpitations, states with neg work-up. Pt also reports trouble sleeping, usually takes melatonin. A&Ox4, ambulatory. Breathing even and unlabored. NAD. 20G IV on left hand placed by float RN. Labs were drawn and sent. Waiting for labs and XR result

## 2023-09-27 NOTE — ED ADULT NURSE NOTE - SUICIDE SCREENING QUESTION 3
Learning About Physical Activity What is physical activity? Physical activity is any kind of activity that gets your body moving. The types of physical activity that can help you get fit and stay healthy include: · Aerobic or \"cardio\" activities that make your heart beat faster and make you breathe harder, such as brisk walking, riding a bike, or running. Aerobic activities strengthen your heart and lungs and build up your endurance. · Strength training activities that make your muscles work against, or \"resist,\" something, such as lifting weights or doing push-ups. These activities help tone and strengthen your muscles. · Stretches that allow you to move your joints and muscles through their full range of motion. Stretching helps you be more flexible and avoid injury. What are the benefits of physical activity? Being active is one of the best things you can do for your health. It helps you to: · Feel stronger and have more energy to do all the things you like to do. · Focus better at school or work. · Feel, think, and sleep better. · Reach and stay at a healthy weight. · Lose fat and build lean muscle. · Lower your risk for serious health problems. · Keep your bones, muscles, and joints strong. How can you make physical activity part of your life? Get at least 30 minutes of exercise on most days of the week. Walking is a good choice. You also may want to do other activities, such as running, swimming, cycling, or playing tennis or team sports. Pick activities that you likeones that make your heart beat faster, your muscles stronger, and your muscles and joints more flexible. If you find more than one thing you like doing, do them all. You don't have to do the same thing every day. Get your heart pumping every day. Any activity that makes your heart beat faster and keeps it at that rate for a while counts. Here are some great ways to get your heart beating faster: · Go for a brisk walk, run, or bike ride. · Go for a hike or swim. · Go in-line skating. · Play a game of touch football, basketball, or soccer. · Ride a bike. · Play tennis or racquetball. · Climb stairs. Even some household chores can be aerobicjust do them at a faster pace. Vacuuming, raking or mowing the lawn, sweeping the garage, and washing and waxing the car all can help get your heart rate up. Strengthen your muscles during the week. You don't have to lift heavy weights or grow big, bulky muscles to get stronger. Doing a few simple activities that make your muscles work against, or \"resist,\" something can help you get stronger. For example, you can: · Do push-ups or sit-ups, which use your own body weight as resistance. · Lift weights or dumbbells or use stretch bands at home or in a gym or community center. Stretch your muscles often. Stretching will help you as you become more active. It can help you stay flexible, loosen tight muscles, and avoid injury. It can also help improve your balance and posture and can be a great way to relax. Be sure to stretch the muscles you'll be using when you work out. It's best to warm your muscles slightly before you stretch them. Walk or do some other light aerobic activity for a few minutes, and then start stretching. When you stretch your muscles: · Do it slowly. Stretching is not about going fast or making sudden movements. · Don't push or bounce during a stretch. · Hold each stretch for at least 15 to 30 seconds, if you can. You should feel a stretch in the muscle, but not pain. · Breathe out as you do the stretch. Then breathe in as you hold the stretch. Don't hold your breath. If you're worried about how more activity might affect your health, have a checkup before you start. Follow any special advice your doctor gives you for getting a smart start. Where can you learn more? Go to http://www.[employer]chu/ Enter P817 in the search box to learn more about \"Learning About Physical Activity. \" Current as of: January 16, 2020               Content Version: 12.6 © 2006-2020 [employer]. Care instructions adapted under license by [employer] (which disclaims liability or warranty for this information). If you have questions about a medical condition or this instruction, always ask your healthcare professional. [address] any warranty or liability for your use of this information. Learning About Physical Activity What is physical activity? Physical activity is any kind of activity that gets your body moving. The types of physical activity that can help you get fit and stay healthy include: · Aerobic or \"cardio\" activities that make your heart beat faster and make you breathe harder, such as brisk walking, riding a bike, or running. Aerobic activities strengthen your heart and lungs and build up your endurance. · Strength training activities that make your muscles work against, or \"resist,\" something, such as lifting weights or doing push-ups. These activities help tone and strengthen your muscles. · Stretches that allow you to move your joints and muscles through their full range of motion. Stretching helps you be more flexible and avoid injury. What are the benefits of physical activity? Being active is one of the best things you can do for your health. It helps you to: · Feel stronger and have more energy to do all the things you like to do. · Focus better at school or work. · Feel, think, and sleep better. · Reach and stay at a healthy weight. · Lose fat and build lean muscle. · Lower your risk for serious health problems. · Keep your bones, muscles, and joints strong. How can you make physical activity part of your life? Get at least 30 minutes of exercise on most days of the week. Walking is a good choice. You also may want to do other activities, such as running, swimming, cycling, or playing tennis or team sports. Pick activities that you likeones that make your heart beat faster, your muscles stronger, and your muscles and joints more flexible. If you find more than one thing you like doing, do them all. You don't have to do the same thing every day. Get your heart pumping every day. Any activity that makes your heart beat faster and keeps it at that rate for a while counts. Here are some great ways to get your heart beating faster: · Go for a brisk walk, run, or bike ride. · Go for a hike or swim. · Go in-line skating. · Play a game of touch football, basketball, or soccer. · Ride a bike. · Play tennis or racquetball. · Climb stairs. Even some household chores can be aerobicjust do them at a faster pace. Vacuuming, raking or mowing the lawn, sweeping the garage, and washing and waxing the car all can help get your heart rate up. Strengthen your muscles during the week. You don't have to lift heavy weights or grow big, bulky muscles to get stronger. Doing a few simple activities that make your muscles work against, or \"resist,\" something can help you get stronger. For example, you can: · Do push-ups or sit-ups, which use your own body weight as resistance. · Lift weights or dumbbells or use stretch bands at home or in a gym or community center. Stretch your muscles often. Stretching will help you as you become more active. It can help you stay flexible, loosen tight muscles, and avoid injury. It can also help improve your balance and posture and can be a great way to relax. Be sure to stretch the muscles you'll be using when you work out. It's best to warm your muscles slightly before you stretch them. Walk or do some other light aerobic activity for a few minutes, and then start stretching. When you stretch your muscles: · Do it slowly. Stretching is not about going fast or making sudden movements. · Don't push or bounce during a stretch. · Hold each stretch for at least 15 to 30 seconds, if you can. You should feel a stretch in the muscle, but not pain. · Breathe out as you do the stretch. Then breathe in as you hold the stretch. Don't hold your breath. If you're worried about how more activity might affect your health, have a checkup before you start. Follow any special advice your doctor gives you for getting a smart start. Where can you learn more? Go to http://www.gray.com/ Enter H762 in the search box to learn more about \"Learning About Physical Activity. \" Current as of: January 16, 2020               Content Version: 12.6 © 9335-8365 [employer]. Care instructions adapted under license by [employer] (which disclaims liability or warranty for this information). If you have questions about a medical condition or this instruction, always ask your healthcare professional. [place] [address] any warranty or liability for your use of this information. No

## 2023-09-27 NOTE — ED PROVIDER NOTE - OBJECTIVE STATEMENT
53-year-old female with history HTN presents to the ED with chest pain for the past 2 hours.  Patient describes her pain as a midsternal nonradiating sharp 10/10 pain.  Patient says she has been having 1 week of high blood pressure and palpitations, 3 days of headaches and chest pain for the past 2 hours.  Patient also endorses left leg.  Patient is not on any anticoagulants.  Patient denies cigarette use, alcohol use or recreational drug use.

## 2023-09-27 NOTE — ED PROVIDER NOTE - COVID-19  TEST TYPE
For acute issues or uncontrolled symptoms please page palliative team.    Lyn Villagomez MD  Geriatrics and Palliative Medicine Attending  [facility] pager: [phone]     The Geriatrics and Palliative Medicine consult service has 24/7 coverage for medical recommendations, including symptom management needs.
MOLECULAR PCR
For additional symptom management recommend the following:    For secretions: IV robinul 0.4mg q6h PRN  For anxiety: IV ativan 0.5 mg q2h prn   For nausea/vomiting: IV zofran 4mg q8h PRN
Opioid  Discharge Recommendations    This patient will leave the hospital with a need for opioids to address symptoms.    Consider review of [facility] literature for prescribing opioids  https://www.health.[place].gov/professionals/narcotic/laws_and_regulations/docs/combat_heroin_legislation_faq.pdf    1.)  Prior to discharge, please ensure that the receiving pharmacy  -has the  medication on formulary  -has the correct dose and formulation of the medication  -has an adequate supply of the medication to accomodate the patient's prescription    2.) Please ensure that there is a physician who will continue to follow up and prescribe opioids as needed.    3.) Please ensure prior authorization is completed, if necessary, prior to discharge.    4.) Please ensure the patient is being discharged with a bowel regimen in place    5.  Has this patient been identified to receive a naloxone prescription?  Yes []  No []     Please schedule an appointment with Dr. Suni Kelly or Dr. Marcia Agudelo at [phone] for follow up prior to discharge.    Please note the following on patient's discharge summary:   "Please see Dr. Suni Kelly of Dr. Marcia Agudelo at [address] [facility] in [place]  for pain and symptom management."    Please include the appointment date in the discharge summary.

## 2023-09-28 DIAGNOSIS — Z29.9 ENCOUNTER FOR PROPHYLACTIC MEASURES, UNSPECIFIED: ICD-10-CM

## 2023-09-28 DIAGNOSIS — R07.9 CHEST PAIN, UNSPECIFIED: ICD-10-CM

## 2023-09-28 DIAGNOSIS — E78.5 HYPERLIPIDEMIA, UNSPECIFIED: ICD-10-CM

## 2023-09-28 DIAGNOSIS — I10 ESSENTIAL (PRIMARY) HYPERTENSION: ICD-10-CM

## 2023-09-28 PROCEDURE — 99223 1ST HOSP IP/OBS HIGH 75: CPT

## 2023-09-28 PROCEDURE — 93010 ELECTROCARDIOGRAM REPORT: CPT

## 2023-09-28 RX ORDER — LANOLIN ALCOHOL/MO/W.PET/CERES
6 CREAM (GRAM) TOPICAL AT BEDTIME
Refills: 0 | Status: DISCONTINUED | OUTPATIENT
Start: 2023-09-28 | End: 2023-09-29

## 2023-09-28 RX ORDER — VALACYCLOVIR 500 MG/1
1 TABLET, FILM COATED ORAL
Refills: 0 | DISCHARGE

## 2023-09-28 RX ORDER — FERROUS SULFATE 325(65) MG
1 TABLET ORAL
Refills: 0 | DISCHARGE

## 2023-09-28 RX ORDER — INFLUENZA VIRUS VACCINE 15; 15; 15; 15 UG/.5ML; UG/.5ML; UG/.5ML; UG/.5ML
0.5 SUSPENSION INTRAMUSCULAR ONCE
Refills: 0 | Status: DISCONTINUED | OUTPATIENT
Start: 2023-09-28 | End: 2023-09-29

## 2023-09-28 RX ADMIN — Medication 10 MILLIGRAM(S): at 17:52

## 2023-09-28 RX ADMIN — Medication 6 MILLIGRAM(S): at 21:14

## 2023-09-28 NOTE — CONSULT NOTE ADULT - NS ATTEND AMEND GEN_ALL_CORE FT
Patient seen and examined. Agree with plan as detailed in PA/NP Note.     Check CT coronaries    Talib Dior MD  Pager: [phone]

## 2023-09-28 NOTE — H&P ADULT - PROBLEM SELECTOR PLAN 2
Reports uncontrolled HTN with systolic 180s over the past week  - currently well-controlled in ED  Plan:  - instructed to f/u with her outpatient provider  - continue home enalapril

## 2023-09-28 NOTE — DISCHARGE NOTE PROVIDER - NSDCMRMEDTOKEN_GEN_ALL_CORE_FT
enalapril 10 mg oral tablet: 1 tab(s) orally 2 times a day  Melatonin 3 mg oral tablet, disintegratin tab(s) orally once a day (at bedtime)   enalapril 10 mg oral tablet: 1 tab(s) orally 2 times a day  ibuprofen 200 mg oral tablet: 1 tab(s) orally 2 times a day x 5 days then take as needed (always take with food)

## 2023-09-28 NOTE — H&P ADULT - NSHPPHYSICALEXAM_GEN_ALL_CORE
VITAL SIGNS:  T(C): 36.8 (09-28-23 @ 08:51), Max: 37.3 (09-28-23 @ 01:05)  T(F): 98.2 (09-28-23 @ 08:51), Max: 99.1 (09-28-23 @ 01:05)  HR: 75 (09-28-23 @ 08:51) (62 - 104)  BP: 113/75 (09-28-23 @ 08:51) (113/75 - 162/89)  BP(mean): --  RR: 21 (09-28-23 @ 08:51) (16 - 21)  SpO2: 100% (09-28-23 @ 08:51) (99% - 100%)  Wt(kg): --    PHYSICAL EXAM:    Constitutional: WDWN resting comfortably in bed; NAD  ENT: no nasal discharge; MMM  Neck: supple  Respiratory: CTA B/L; no W/R/R, no retractions  Cardiac: +S1/S2; RRR; no M/R/G  Gastrointestinal: soft, NT/ND; no rebound or guarding  Extremities: WWP, no clubbing or cyanosis; no peripheral edema  Vascular: 2+ radial, DP pulses B/L  Neurologic: AAOx3; CNII-XII grossly intact; no focal deficits  Psychiatric: affect and characteristics of appearance, verbalizations, behaviors are appropriate

## 2023-09-28 NOTE — H&P ADULT - HISTORY OF PRESENT ILLNESS
53F PMH HTN presented to the ED with chest pain for 2 hours. Patient describes her pain as a midsternal nonradiating sharp 10/10 pain. Said that this pain concerned her because she has been having high blood pressure in the systolic 180s. Chest pain has resolved by the time of interview. Denies fever, chills, shortness of breath, nausea, vomiting, diarrhea, leg pain/swelling. This is patient's 3rd ED visit in the past week, once for chest pain with negative trops, EKG, and stress test, and once for facial numbness that self-resolved with negative CTA head and neck. Reports that she has not been sleeping well and thinks that may be contributing to her symptoms.

## 2023-09-28 NOTE — H&P ADULT - NSHPLABSRESULTS_GEN_ALL_CORE
LABS:                         11.9   8.62  )-----------( 369      ( 27 Sep 2023 22:00 )             36.4     09-27    140  |  103  |  6<L>  ----------------------------<  120<H>  3.5   |  23  |  0.62    Ca    9.9      27 Sep 2023 22:00    TPro  8.2  /  Alb  4.6  /  TBili  0.3  /  DBili  x   /  AST  19  /  ALT  17  /  AlkPhos  64  09-27      Urinalysis Basic - ( 27 Sep 2023 22:00 )    Color: x / Appearance: x / SG: x / pH: x  Gluc: 120 mg/dL / Ketone: x  / Bili: x / Urobili: x   Blood: x / Protein: x / Nitrite: x   Leuk Esterase: x / RBC: x / WBC x   Sq Epi: x / Non Sq Epi: x / Bacteria: x      CARDIAC MARKERS ( 27 Sep 2023 22:00 )  x     / x     / 125 U/L / x     / 1.4 ng/mL            RADIOLOGY, EKG & ADDITIONAL TESTS: Reviewed.

## 2023-09-28 NOTE — DISCHARGE NOTE PROVIDER - NSDCCPCAREPLAN_GEN_ALL_CORE_FT
PRINCIPAL DISCHARGE DIAGNOSIS  Diagnosis: Chest pain  Assessment and Plan of Treatment: You came to the emergency room with chest pain that resolved on its own. Given your recent negative stress test, this was unlikely to be a heart attack. Instead it was more likely to be a muscle strain. Please follow up with your primary care doctor to further monitor.      SECONDARY DISCHARGE DIAGNOSES  Diagnosis: HTN (hypertension)  Assessment and Plan of Treatment: You reported that your blood pressure has been high at home. Your blood pressure was fine in the emergency room. Please follow up with your primary care doctor to further monitor your blood pressure.    Diagnosis: HLD (hyperlipidemia)  Assessment and Plan of Treatment: We noticed that your cholesterol was high on your blood work. We discussed starting a medication called atorvastatin to help lower your cholesterol in order to prevent the risk of future heart attacks and strokes. Please take this medication as prescribed and follow up with your primary care doctor to further monitor.     PRINCIPAL DISCHARGE DIAGNOSIS  Diagnosis: Chest pain  Assessment and Plan of Treatment: You came to the emergency room with chest pain that resolved on its own. You underwent a CT of the coronary arteries which revealed a calcium score of 0 so this pain is not related to coronary artery disease. likely musculoskeletal pain. Per Dr. Dior (cardiologist) take Ibuprofen 200mg twice a day x 5 days to reduce inflammation. Follow up with your primary care physician for further monitoring in 1-2 weeks. Please call to arrange appointment.      SECONDARY DISCHARGE DIAGNOSES  Diagnosis: HTN (hypertension)  Assessment and Plan of Treatment: You reported that your blood pressure has been high at home. Your blood pressure was fine in the emergency room. Please follow up with your primary care doctor to further monitor your blood pressure. Low salt diet    Diagnosis: HLD (hyperlipidemia)  Assessment and Plan of Treatment: Continue diet modification. Follow up with your primary care physician for further monitoring in 1-2 weeks. Please call to arrange appointment. Low cholesterol diet.

## 2023-09-28 NOTE — H&P ADULT - TIME BILLING
Time-based billing (NON-critical care).     More than 50% of the visit was spent counseling and / or coordinating care by the attending physician.      The necessity of the time spent during the encounter on this date of service was due to: documentation in [place], reviewing chart and coordinating care with patient/ACPs and interdisciplinary staff (such as , social workers, etc) as well as reviewing vitals, laboratory data, radiology, medication list, previous consultants' recommendations, prior records, and preparing for discharge. Interventions were performed as documented above.

## 2023-09-28 NOTE — H&P ADULT - ASSESSMENT
53F PMH HTN presented to the ED with chest pain for 2 hours that has since resolved with recent ischemic evaluation including stress test negative.

## 2023-09-28 NOTE — H&P ADULT - PROBLEM SELECTOR PLAN 3
ASCVD 5.7%  - patient amenable to starting statin  Plan:  - will start lipitor 10mg and instructed to follow up o/p

## 2023-09-28 NOTE — H&P ADULT - PROBLEM SELECTOR PLAN 1
Presented with 2 hours of midsternal nonradiating sharp 10/10 chest pain that self-resolved. Denies fever, chills, shortness of breath, nausea, vomiting, leg pain/swelling. Says that she was able to reproduce pain with palpation of the area and she had tried to massage it out.  - patient was in ED 9/21 for chest pain and palpitations and ACS was ruled out with negative trops, EKG. Cardiology was consulted and stress test was normal  - trops negative  - CXR without acute cardiopulmonary process  Plan:  - likely MSK given recent negative stress test, negative trops. low concern for PE as pain as self-resolved and was reproducible, no sign of DVT.  - f/u EKG

## 2023-09-28 NOTE — CONSULT NOTE ADULT - SUBJECTIVE AND OBJECTIVE BOX
date of consult 23    HISTORY OF PRESENT ILLNESS: HPI:  53F PMH of HTN, with normal echo and NST last week, presented to the ED with chest pain for 2 hours. Patient describes her pain as a midsternal nonradiating sharp 10/10 pain. Said that this pain concerned her because she has been having high blood pressure in the systolic 180s. Chest pain has resolved by the time of interview. Denies fever, chills, shortness of breath, nausea, vomiting, diarrhea, leg pain/swelling. This is patient's 3rd ED visit in the past week, once for chest pain with negative trops, EKG, and stress test, and once for facial numbness that self-resolved with negative CTA head and neck. Reports that she has not been sleeping well and thinks that may be contributing to her symptoms. (28 Sep 2023 09:00)    Pain has since resolved upon our evaluation.  Pain was reproducible and worse on exertion.    PAST MEDICAL & SURGICAL HISTORY:  HTN (hypertension)      H/O herpes genitalis      S/P       S/P cholecystectomy      MEDICATIONS  (STANDING):  enalapril 10 milliGRAM(s) Oral two times a day      Allergies  No Known Allergies      FAMILY HISTORY:  Non-contributary for premature coronary disease or sudden cardiac death    SOCIAL HISTORY:    [x ] Non-smoker  [ ] Smoker  [ ] Alcohol    FLU VACCINE THIS YEAR STARTS IN AUGUST:  [ ] Yes    [ ] No    IF OVER 65 HAVE YOU EVER HAD A PNA VACCINE:  [ ] Yes    [ ] No       [ ] N/A      REVIEW OF SYSTEMS:  [x ]chest pain  [  ]shortness of breath  [  ]palpitations  [  ]syncope  [ ]near syncope [ ]upper extremity weakness   [ ] lower extremity weakness  [  ]diplopia  [  ]altered mental status   [  ]fevers  [ ]chills [ ]nausea  [ ]vomiting  [  ]dysphagia    [ ]abdominal pain  [ ]melena  [ ]BRBPR    [  ]epistaxis  [  ]rash    [ ]lower extremity edema        [x ] All others negative	  [ ] Unable to obtain      LABS:	 	    CARDIAC MARKERS:  CARDIAC MARKERS ( 27 Sep 2023 22:00 )  x     / x     / 125 U/L / x     / 1.4 ng/mL                              11.9   8.62  )-----------( 369      ( 27 Sep 2023 22:00 )             36.4     Hb Trend: 11.9<--        140  |  103  |  6<L>  ----------------------------<  120<H>  3.5   |  23  |  0.62    Ca    9.9      27 Sep 2023 22:00    TPro  8.2  /  Alb  4.6  /  TBili  0.3  /  DBili  x   /  AST  19  /  ALT  17  /  AlkPhos  64      Creatinine Trend: 0.62<--, 0.64<--, 0.69<--      PHYSICAL EXAM:  T(C): 36.8 (23 @ 08:51), Max: 37.3 (23 @ 01:05)  HR: 75 (23 @ 08:51) (62 - 104)  BP: 113/75 (23 @ 08:51) (113/75 - 162/89)  RR: 21 (23 @ 08:51) (16 - 21)  SpO2: 100% (23 @ 08:51) (99% - 100%)  Wt(kg): --     I&O's Summary      Gen: Appears well in NAD  HEENT:  (-)icterus (-)pallor  CV: N S1 S2 1/6 RAFAEL (+)2 Pulses B/l  Resp:  Clear to ausculatation B/L, normal effort  GI: (+) BS Soft, NT, ND  Lymph:  (-)Edema, (-)obvious lymphadenopathy  Skin: Warm to touch, Normal turgor  Psych: Appropriate mood and affect 	      ECG:  NSR	    < from: TTE W or WO Ultrasound Enhancing Agent (23 @ 08:07) >  CONCLUSIONS:      1. The left ventricular cavity is normal size. Left ventricular wall thickness is normal. Left ventricular systolic function is normal with an ejection fraction of 61 % bySimpson's method of disks.   2. There is normal left ventricular diastolic function.   3. Right ventricular cavity is normal in size and normal systolic function.   4. Trace mitral regurgitation.    < end of copied text >    < from: Nuclear Stress Test-Exercise.. (23 @ 09:25) >  Conclusions:   1. Normal myocardial perfusion scan, with no evidence of infarction or inducible ischemia.   2. Normal left ventricular regional wall motion.   3. Patient achieved 10.1 METS, which is consistent with good exercise capacity.   4. Normal heart rate response.   5. Normal blood pressure response.   6. The left ventricle is normal in function and normal in size. Normal left ventricular diastolic function. The left ventricular EF% during stress is 55 %.   7. Normal right ventricular function.   8. The Duke Treadmill Score is 9.00, which is consistent with low risk (=5) for future cardiac events.    < end of copied text >      ASSESSMENT/PLAN: 	53F PMH of HTN, with normal echo and NST last week, presented to the ED with chest pain    --ACS ruled out  --Recent TTE and NST WNL as above  --r/o CAD with Coronary CT  --cont Enalapril for HTN    --if coronary CT normal, no further cardiac work up planned  --refer to medicine and cardio clinic for OP F/U

## 2023-09-28 NOTE — ED ADULT NURSE REASSESSMENT NOTE - NS ED NURSE REASSESS COMMENT FT1
Break RN: Pt is A&Ox4, resting in stretcher with complaints of not being able to sleep at this time. Respirations even and unlabored, chest rise equal b/l. Sinus rhythm noted on cardiac monitor. VS as noted in flow sheets. Pt denies chest pain, palpitations, SOB, fever, cough, chills, abdominal pain, N/V/D, h/a, dizziness, numbness/tingling or any urinary symptoms at this time. No acute distress noted. Safety maintained throughout. Will continue to monitor.

## 2023-09-28 NOTE — DISCHARGE NOTE PROVIDER - HOSPITAL COURSE
53F PMH HTN presented to the ED with chest pain for 2 hours that has since resolved with recent ischemic evaluation including negative stress test, stable for d/c.    #Chest pain.   - Presented with 2 hours of midsternal nonradiating sharp 10/10 chest pain that self-resolved. Denies fever, chills, shortness of breath, nausea, vomiting, leg pain/swelling. Says that she was able to reproduce pain with palpation of the area and she had tried to massage it out.  - patient was in ED 9/21 for chest pain and palpitations and ACS was ruled out with negative trops, EKG. Cardiology was consulted and stress test was normal  - trops negative  - CXR without acute cardiopulmonary process  - EKG NSR without ischemic changes  Plan:  - likely MSK given pt reports it was reproducible and has since self-resolved. unlikely ACS given recent negative stress test, negative trops and EKG. low concern for PE as pain as self-resolved and was reproducible, no sign of DVT.  - no need to trend trops as pain has resolved and recent negative stress test    #HTN (hypertension).   - Reports uncontrolled HTN with systolic 180s over the past week  - currently well-controlled in ED  Plan:  - instructed to f/u with her outpatient provider  - continue home enalapril    #HLD (hyperlipidemia).   - ASCVD 5.7%  - patient amenable to starting statin  Plan:  - will start lipitor 10mg and instructed to follow up o/p. 53F PMH of HTN, with normal echo and NST last week, presented to the ED with chest pain    ACS ruled out by negative cardiac enzymes. Pt had recent echo/stress as noted below.   Echocardiogram revealed left ventricular cavity is normal size. Left ventricular wall thickness is normal. Left ventricular systolic function is normal with an ejection fraction of 61 % bySimpson's method of disks. There is normal left ventricular diastolic function. Right ventricular cavity is normal in size and normal systolic function. Trace mitral regurgitation.  NST revealed normal myocardial perfusion scan, with no evidence of infarction or inducible ischemia. Normal left ventricular regional wall motion. Patient achieved 10.1 METS, which is consistent with good exercise capacity. Normal heart rate response. Normal blood pressure response. The left ventricle is normal in function and normal in size. Normal left ventricular diastolic function. The left ventricular EF% during stress is 55 %.  Normal right ventricular function. The Duke Treadmill Score is 9.00, which is consistent with low risk (=5) for future cardiac events.  - Continue Enalapril for HTN  - CT coronary arteries: No significant stenosis. The calculated Agatston score is 0.  - Per cardiology if CT negative can discharge on NSAIDs    Case discussed with Dr. Dior, labs/vitals/medications reviewed, recommend to start ibuprofen 200mg BID x 5 days, outpt follow up as directed with PCP.

## 2023-09-28 NOTE — DISCHARGE NOTE PROVIDER - CARE PROVIDER_API CALL
Talib Dior  Interventional Cardiology  [address] [place] [address], [place] [id number]  Phone: [phone]  Fax: [phone]  Follow Up Time:

## 2023-09-29 VITALS
TEMPERATURE: 99 F | SYSTOLIC BLOOD PRESSURE: 135 MMHG | DIASTOLIC BLOOD PRESSURE: 85 MMHG | OXYGEN SATURATION: 100 % | RESPIRATION RATE: 18 BRPM | HEART RATE: 75 BPM

## 2023-09-29 LAB
ANION GAP SERPL CALC-SCNC: 15 MMOL/L — HIGH (ref 7–14)
BUN SERPL-MCNC: 15 MG/DL — SIGNIFICANT CHANGE UP (ref 7–23)
CALCIUM SERPL-MCNC: 9.8 MG/DL — SIGNIFICANT CHANGE UP (ref 8.4–10.5)
CHLORIDE SERPL-SCNC: 96 MMOL/L — LOW (ref 98–107)
CO2 SERPL-SCNC: 24 MMOL/L — SIGNIFICANT CHANGE UP (ref 22–31)
CREAT SERPL-MCNC: 0.74 MG/DL — SIGNIFICANT CHANGE UP (ref 0.5–1.3)
EGFR: 98 ML/MIN/1.73M2 — SIGNIFICANT CHANGE UP
GLUCOSE SERPL-MCNC: 78 MG/DL — SIGNIFICANT CHANGE UP (ref 70–99)
HCT VFR BLD CALC: 36.3 % — SIGNIFICANT CHANGE UP (ref 34.5–45)
HGB BLD-MCNC: 11.9 G/DL — SIGNIFICANT CHANGE UP (ref 11.5–15.5)
MAGNESIUM SERPL-MCNC: 2.3 MG/DL — SIGNIFICANT CHANGE UP (ref 1.6–2.6)
MCHC RBC-ENTMCNC: 27.4 PG — SIGNIFICANT CHANGE UP (ref 27–34)
MCHC RBC-ENTMCNC: 32.8 GM/DL — SIGNIFICANT CHANGE UP (ref 32–36)
MCV RBC AUTO: 83.6 FL — SIGNIFICANT CHANGE UP (ref 80–100)
NRBC # BLD: 0 /100 WBCS — SIGNIFICANT CHANGE UP (ref 0–0)
NRBC # FLD: 0 K/UL — SIGNIFICANT CHANGE UP (ref 0–0)
PHOSPHATE SERPL-MCNC: 3.8 MG/DL — SIGNIFICANT CHANGE UP (ref 2.5–4.5)
PLATELET # BLD AUTO: 333 K/UL — SIGNIFICANT CHANGE UP (ref 150–400)
POTASSIUM SERPL-MCNC: 3.6 MMOL/L — SIGNIFICANT CHANGE UP (ref 3.5–5.3)
POTASSIUM SERPL-SCNC: 3.6 MMOL/L — SIGNIFICANT CHANGE UP (ref 3.5–5.3)
RBC # BLD: 4.34 M/UL — SIGNIFICANT CHANGE UP (ref 3.8–5.2)
RBC # FLD: 13.7 % — SIGNIFICANT CHANGE UP (ref 10.3–14.5)
SODIUM SERPL-SCNC: 135 MMOL/L — SIGNIFICANT CHANGE UP (ref 135–145)
WBC # BLD: 8.56 K/UL — SIGNIFICANT CHANGE UP (ref 3.8–10.5)
WBC # FLD AUTO: 8.56 K/UL — SIGNIFICANT CHANGE UP (ref 3.8–10.5)

## 2023-09-29 PROCEDURE — 75574 CT ANGIO HRT W/3D IMAGE: CPT | Mod: 26

## 2023-09-29 RX ORDER — IBUPROFEN 200 MG
1 TABLET ORAL
Qty: 0 | Refills: 0 | DISCHARGE

## 2023-09-29 RX ADMIN — Medication 10 MILLIGRAM(S): at 06:09

## 2023-09-29 NOTE — DISCHARGE NOTE NURSING/CASE MANAGEMENT/SOCIAL WORK - PATIENT PORTAL LINK FT
You can access the FollowMyHealth Patient Portal offered by [facility] by registering at the following website: [email or web address]. By joining [facility]’s FollowMyHealth portal, you will also be able to view your health information using other applications (apps) compatible with our system.

## 2023-10-06 NOTE — ED ADULT TRIAGE NOTE - MEANS OF ARRIVAL
Breast ultrasound was place on her last ov. If radiology recommends a different test please pend it.     [facility] ambulatory

## 2025-05-02 NOTE — ED CDU PROVIDER DISPOSITION NOTE - ATTENDING SHARED VISIT SELECTORS
Anesthesia Pre Eval Note    Anesthesia ROS/Med Hx    Overall Review:  EKG was reviewed and Echo was reviewed     Anesthetic Complication History:    Patient does not have a history of anesthetic complications      Pulmonary Review:    Positive for asthma, well controlled    Neuro/Psych Review:       Positive for headaches  Positive for psychiatric history - Anxiety and Depression    Cardiovascular Review:   Exercise tolerance: good (>4 METS)  Positive for CAD  Positive for AMOR/ SOB  Positive for hypertension - well controlled  Positive for hyperlipidemia    GI/HEPATIC/RENAL Review:     Positive for GERD - poorly controlled Positive for liver disease and transaminitis   Positive for renal disease - chronic renal insufficiency    End/Other Review:  Positive for diabetes - type 2 - well controlled  Positive for anemia - Chronic  Positive for arthritis  Additional Results:  EKG:  No results found for this or any previous visit (from the past 4464 hours).    ALLERGIES:  No Known Allergies   Last Labs        Component                Value               Date/Time                  WBC                      6.4                 04/21/2025 0846            RBC                      3.65 (L)            04/21/2025 0846            HGB                      11.4 (L)            04/21/2025 0846            HCT                      37.4 (L)            04/21/2025 0846            MCV                      102.5 (H)           04/21/2025 0846            MCH                      31.2                04/21/2025 0846            MCHC                     30.5 (L)            04/21/2025 0846            RDW-CV                   16.1 (H)            04/21/2025 0846            Sodium                   142                 04/21/2025 0846            Potassium                4.8                 04/21/2025 0846            Chloride                 112 (H)             04/21/2025 0846            Carbon Dioxide           27                  04/21/2025 0846             Glucose                  85                  04/21/2025 0846            BUN                      40 (H)              04/21/2025 0846            Creatinine               1.88 (H)            04/21/2025 0846            Glomerular Filtrati*     39 (L)              04/21/2025 0846            Calcium                  8.8                 04/21/2025 0846            PLT                      226                 04/21/2025 0846            PTT                      27                  04/21/2025 0846            INR                      0.9                 04/21/2025 0846        Past Medical History:  No date: Allergy  No date: Asthma (CMD)  No date: Change in stool  No date: Chronic kidney disease (CKD), stage III (moderate)  (CMD)  No date: Concussion  No date: Diabetes mellitus, type 2  (CMD)  No date: GERD (gastroesophageal reflux disease)  No date: Gout  No date: H/O laparoscopic partial gastrectomy  No date: Head trauma  No date: Heartburn  No date: Hypertension  No date: Liver disease  No date: Migraine  No date: Ventral hernia  Past Surgical History:  No date: Appendectomy  No date: Cholecystectomy  No date: Esophagogastroduodenoscopy transoral flex w/bx single or mult  No date: Hernia mesh removal  No date: Hernia repair  No date: Knee scope,diagnostic; Left  01/2024: Neck/chest procedure unlisted  No date: Open access colonoscopy   Prior to Admission medications :  Medication famotidine (PEPCID) 20 MG tablet, Sig Take 2 tablets by mouth in the morning and 2 tablets in the evening., Start Date 4/28/25, End Date , Taking? Yes, Authorizing Provider Duran Olivo MD    Medication electrolyte/PEG 3350 (NULYTELY) 420 g solution, Sig Take first half of prep between 5pm and 7pm. Take the second half of the prep between 1am and 3am the day of the procedure., Start Date 4/24/25, End Date , Taking? Yes, Authorizing Provider Chantale May CNP    Medication bisacodyl (DULCOLAX) 5 MG EC tablet, Sig Take 2 tablets after  drinking the first half of the prep., Start Date 4/24/25, End Date , Taking? Yes, Authorizing Provider Chantale May CNP    Medication albuterol 108 (90 Base) MCG/ACT inhaler, Sig INHALE 2 PUFFS INTO THE LUNGS EVERY 4 HOURS AS NEEDED FOR SHORTNESS OF BREATH OR WHEEZING, Start Date 4/9/25, End Date , Taking? Yes, Authorizing Provider Duran Olivo MD    Medication fluticasone (FLONASE) 50 MCG/ACT nasal spray, Sig SPRAY 1 SPRAY INTO EACH NOSTRIL EVERY DAY, Start Date 4/6/25, End Date , Taking? Yes, Authorizing Provider Duran Olivo MD    Medication atorvastatin (LIPITOR) 40 MG tablet, Sig TAKE 1 TABLET BY MOUTH EVERY DAY, Start Date 3/10/25, End Date , Taking? Yes, Authorizing Provider Jose Shea MD    Medication venlafaxine XR (EFFEXOR XR) 150 MG 24 hr capsule, Sig TAKE 1 CAPSULE BY MOUTH EVERY DAY, Start Date 2/16/25, End Date , Taking? Yes, Authorizing Provider Duran Olivo MD    Medication gabapentin (NEURONTIN) 600 MG tablet, Sig TAKE 1 TABLET BY MOUTH EVERY 8 HOURS, Start Date 1/13/25, End Date , Taking? Yes, Authorizing Provider Duran Olivo MD    Medication montelukast (SINGULAIR) 10 MG tablet, Sig TAKE 1 TABLET BY MOUTH EVERY DAY, Start Date 12/11/24, End Date , Taking? Yes, Authorizing Provider Duran Olivo MD    Medication Azelastine HCl 137 MCG/SPRAY Solution, Sig SPRAY 1 SPRAY IN EACH NOSTRIL IN THE MORNING AND 1 SPRAY IN THE EVENING. USE IN EACH NOSTRIL AS DIRECTED, Start Date 12/2/24, End Date , Taking? Yes, Authorizing Provider Duran Olivo MD    Medication Jardiance 25 MG tablet, Sig TAKE 1 TABLET BY MOUTH EVERY DAY BEFORE BREAKFAST, Start Date 7/18/24, End Date , Taking? Yes, Authorizing Provider Duran Olivo MD    Medication lisinopril (ZESTRIL) 40 MG tablet, Sig Take 40 mg by mouth daily., Start Date 1/16/24, End Date , Taking? Yes, Authorizing Provider Gregg Reinoso MD    Medication Probiotic Product (PROBIOTIC 10 ULTRA STRENGTH PO), Sig Take 1 capsule by  mouth daily., Start Date , End Date , Taking? Yes, Authorizing Provider Provider, Outside    Medication acetaminophen (TYLENOL) 500 MG tablet, Sig Take 500 mg by mouth every 6 hours as needed for Pain., Start Date , End Date , Taking? Yes, Authorizing Provider Provider, Outside    Medication traMADol (ULTRAM) 50 MG tablet, Sig Take 1 tablet by mouth every 8 hours as needed for Pain., Start Date 4/28/25, End Date , Taking? , Authorizing Provider Duran Olivo MD    Medication naLOXone (NARCAN) 4 MG/0.1ML nasal liquid, Sig Spray the content of 1 device into 1 nostril. Call 911. May repeat with 2nd device in alternate nostril if no response in 2-3 minutes., Start Date 4/28/25, End Date , Taking? , Authorizing Provider Duran Olivo MD    Medication dicyclomine (BENTYL) 10 MG capsule, Sig TAKE 1 CAPSULE BY MOUTH EVERY 8 HOURS AS NEEDED (ABDOMINAL PAIN)., Start Date , End Date , Taking? , Authorizing Provider Provider, Outside    Medication dulaglutide (Trulicity) 1.5 MG/0.5ML pen-injector, Sig Indications: Type 2 Diabetes INJECT 1.5 MG INTO THE SKIN EVERY 7 DAYS, Start Date 3/25/25, End Date , Taking? , Authorizing Provider Duran Olivo MD    Medication ondansetron (ZOFRAN ODT) 4 MG disintegrating tablet, Sig Place 1 tablet onto the tongue every 8 hours as needed for Nausea., Start Date 12/3/24, End Date , Taking? , Authorizing Provider Tahmina Alaniz,     Medication tiZANidine (ZANAFLEX) 4 MG tablet, Sig Take 1 tablet by mouth every 6 hours as needed (muscle spasms)., Start Date 1/30/24, End Date , Taking? , Authorizing Provider Cristy Stephenson APNP    Medication lovastatin (MEVACOR) 10 MG tablet, Sig Take 1 tablet by mouth every 24 hours., Start Date 10/23/23, End Date 11/27/23, Taking? , Authorizing Provider Duran Olivo MD        Relevant Problems   No relevant active problems       Physical Exam     Airway   Mallampati: II  TM Distance: >3 FB  Neck ROM: Full  Neck: Non-tender and Able to  place in sniff position  TMJ Mobility: Good    Cardiovascular  Cardiovascular exam normal  Cardio Rhythm: Regular  Cardio Rate: Normal    Head Assessment  Head assessment: Normocephalic and Atraumatic    General Assessment  General Assessment: Alert and oriented and No acute distress    Dental Exam  Dental exam normal    Pulmonary Exam  Pulmonary exam normal  Breath sounds clear to auscultation:  Yes  Patient Demonstrates:  Non-labored Breathing    Abdominal Exam  Abdominal exam normal    Anesthesia Plan:    Phone call attempted:   Case discussed with Patient or Representative    ASA Status: 3  Anesthesia Type: MAC    Induction: Intravenous  Preferred Airway Type: Nasal Cannula  Patient does not have a difficult airway or is not at risk of aspiration.   Appropriate airway precautions are in place.  Maintenance: TIVA  Premedication: None    Patient does not have an implantable electronic device requiring post procedure programming.     Post-op Pain Management: Per Surgeon  Postoperative analgesia plan does NOT include opiods    Checklist  Reviewed: Lab Results, EKG, Chest X-Rays, Nursing Notes, Patient Summary, Beta Blocker Status, Outside Records, Care Everywhere, DNR Status, NPO Status, Allergies, Medications, Problem list and Past Med History  Consent/Risks Discussed Statement:  The proposed anesthetic plan, including its risks and benefits, have been discussed with the Patient along with the risks and benefits of alternatives. Questions were encouraged and answered and the patient and/or representative understands and agrees to proceed.    I have discussed elements of the patient's history or examination, as noted above and/or as follows, that place the patient at higher risk of complications; hematological disease, kidney disease, liver disease, neurological disease, pulmonary disease and sleep apnea.    I discussed with the patient (and/or patient's legal representative) the risks and benefits of the proposed  anesthesia plan, MAC, which may include services performed by other anesthesia providers.    Alternative anesthesia plans, if available, were reviewed with the patient (and/or patient's legal representative). Discussion has been held with the patient (and/or patient's legal representative) regarding risks of anesthesia, which include Sore Throat, Dental Injury, Hypotension, Allergic Reaction, Aspiration, Intra-operative Awareness, Emergence Delirium, conversion to general anesthesia, allergic reaction, infection, anxiety, intra-operative awareness, aspiration, memory loss, nausea, oral injury, dental injury, organ damage, persistent pain, depressed breathing, emergence delirium, post-op intubation, sore throat, eye injury, headache, hypotension, ICU admit and vomiting and emergent situations that may require change in anesthesia plan.    The patient (and/or patient's legal representative) has indicated understanding, his/her questions have been answered, and he/she wishes to proceed with the planned anesthetic.    Informed Consent for Blood: Consented  Blood Products: Not Anticipated    Comments  Plan Comments: RECORDS, EKG AND CHEST X-RAYS WHEN AVAILABLE   History/Exam/Medical Decision Making

## 2025-08-24 ENCOUNTER — EMERGENCY (EMERGENCY)
Facility: HOSPITAL | Age: 55
LOS: 1 days | End: 2025-08-24
Attending: STUDENT IN AN ORGANIZED HEALTH CARE EDUCATION/TRAINING PROGRAM | Admitting: STUDENT IN AN ORGANIZED HEALTH CARE EDUCATION/TRAINING PROGRAM
Payer: SELF-PAY

## 2025-08-24 VITALS
HEART RATE: 79 BPM | WEIGHT: 190.92 LBS | DIASTOLIC BLOOD PRESSURE: 87 MMHG | TEMPERATURE: 98 F | SYSTOLIC BLOOD PRESSURE: 175 MMHG | RESPIRATION RATE: 18 BRPM | HEIGHT: 61 IN | OXYGEN SATURATION: 99 %

## 2025-08-24 VITALS
TEMPERATURE: 98 F | OXYGEN SATURATION: 100 % | RESPIRATION RATE: 17 BRPM | HEART RATE: 86 BPM | DIASTOLIC BLOOD PRESSURE: 105 MMHG | SYSTOLIC BLOOD PRESSURE: 175 MMHG

## 2025-08-24 DIAGNOSIS — Z98.891 HISTORY OF UTERINE SCAR FROM PREVIOUS SURGERY: Chronic | ICD-10-CM

## 2025-08-24 DIAGNOSIS — Z90.49 ACQUIRED ABSENCE OF OTHER SPECIFIED PARTS OF DIGESTIVE TRACT: Chronic | ICD-10-CM

## 2025-08-24 LAB
ALBUMIN SERPL ELPH-MCNC: 4.4 G/DL — SIGNIFICANT CHANGE UP (ref 3.3–5)
ALP SERPL-CCNC: 81 U/L — SIGNIFICANT CHANGE UP (ref 40–120)
ALT FLD-CCNC: 17 U/L — SIGNIFICANT CHANGE UP (ref 4–33)
ANION GAP SERPL CALC-SCNC: 15 MMOL/L — HIGH (ref 7–14)
AST SERPL-CCNC: 17 U/L — SIGNIFICANT CHANGE UP (ref 4–32)
BASOPHILS # BLD AUTO: 0.03 K/UL — SIGNIFICANT CHANGE UP (ref 0–0.2)
BASOPHILS # BLD MANUAL: 0 K/UL — SIGNIFICANT CHANGE UP (ref 0–0.2)
BASOPHILS NFR BLD AUTO: 0.5 % — SIGNIFICANT CHANGE UP (ref 0–2)
BASOPHILS NFR BLD MANUAL: 0 % — SIGNIFICANT CHANGE UP (ref 0–2)
BILIRUB SERPL-MCNC: 0.3 MG/DL — SIGNIFICANT CHANGE UP (ref 0.2–1.2)
BUN SERPL-MCNC: 11 MG/DL — SIGNIFICANT CHANGE UP (ref 7–23)
CALCIUM SERPL-MCNC: 9.7 MG/DL — SIGNIFICANT CHANGE UP (ref 8.4–10.5)
CHLORIDE SERPL-SCNC: 100 MMOL/L — SIGNIFICANT CHANGE UP (ref 98–107)
CO2 SERPL-SCNC: 23 MMOL/L — SIGNIFICANT CHANGE UP (ref 22–31)
CREAT SERPL-MCNC: 0.71 MG/DL — SIGNIFICANT CHANGE UP (ref 0.5–1.3)
EGFR: 102 ML/MIN/1.73M2 — SIGNIFICANT CHANGE UP
EGFR: 102 ML/MIN/1.73M2 — SIGNIFICANT CHANGE UP
EOSINOPHIL # BLD AUTO: 0.23 K/UL — SIGNIFICANT CHANGE UP (ref 0–0.5)
EOSINOPHIL # BLD MANUAL: 0.21 K/UL — SIGNIFICANT CHANGE UP (ref 0–0.5)
EOSINOPHIL NFR BLD AUTO: 3.7 % — SIGNIFICANT CHANGE UP (ref 0–6)
EOSINOPHIL NFR BLD MANUAL: 3.4 % — SIGNIFICANT CHANGE UP (ref 0–6)
GIANT PLATELETS BLD QL SMEAR: PRESENT
GLUCOSE SERPL-MCNC: 102 MG/DL — HIGH (ref 70–99)
HCT VFR BLD CALC: 38 % — SIGNIFICANT CHANGE UP (ref 34.5–45)
HGB BLD-MCNC: 12.2 G/DL — SIGNIFICANT CHANGE UP (ref 11.5–15.5)
IMM GRANULOCYTES # BLD AUTO: 0.01 K/UL — SIGNIFICANT CHANGE UP (ref 0–0.07)
IMM GRANULOCYTES NFR BLD AUTO: 0.2 % — SIGNIFICANT CHANGE UP (ref 0–0.9)
LYMPHOCYTES # BLD AUTO: 2.13 K/UL — SIGNIFICANT CHANGE UP (ref 1–3.3)
LYMPHOCYTES # BLD MANUAL: 1.74 K/UL — SIGNIFICANT CHANGE UP (ref 1–3.3)
LYMPHOCYTES NFR BLD AUTO: 33.8 % — SIGNIFICANT CHANGE UP (ref 13–44)
LYMPHOCYTES NFR BLD MANUAL: 27.6 % — SIGNIFICANT CHANGE UP (ref 13–44)
MCHC RBC-ENTMCNC: 26.8 PG — LOW (ref 27–34)
MCHC RBC-ENTMCNC: 32.1 G/DL — SIGNIFICANT CHANGE UP (ref 32–36)
MCV RBC AUTO: 83.3 FL — SIGNIFICANT CHANGE UP (ref 80–100)
MONOCYTES # BLD AUTO: 0.34 K/UL — SIGNIFICANT CHANGE UP (ref 0–0.9)
MONOCYTES # BLD MANUAL: 0.33 K/UL — SIGNIFICANT CHANGE UP (ref 0–0.9)
MONOCYTES NFR BLD AUTO: 5.4 % — SIGNIFICANT CHANGE UP (ref 2–14)
MONOCYTES NFR BLD MANUAL: 5.2 % — SIGNIFICANT CHANGE UP (ref 2–14)
NEUTROPHILS # BLD AUTO: 3.56 K/UL — SIGNIFICANT CHANGE UP (ref 1.8–7.4)
NEUTROPHILS # BLD MANUAL: 4.02 K/UL — SIGNIFICANT CHANGE UP (ref 1.8–7.4)
NEUTROPHILS NFR BLD AUTO: 56.4 % — SIGNIFICANT CHANGE UP (ref 43–77)
NEUTROPHILS NFR BLD MANUAL: 63.8 % — SIGNIFICANT CHANGE UP (ref 43–77)
NRBC # BLD AUTO: 0 K/UL — SIGNIFICANT CHANGE UP (ref 0–0)
NRBC # FLD: 0 K/UL — SIGNIFICANT CHANGE UP (ref 0–0)
NRBC BLD AUTO-RTO: 0 /100 WBCS — SIGNIFICANT CHANGE UP (ref 0–0)
NT-PROBNP SERPL-SCNC: <36 PG/ML — SIGNIFICANT CHANGE UP
PLAT MORPH BLD: NORMAL — SIGNIFICANT CHANGE UP
PLATELET # BLD AUTO: 390 K/UL — SIGNIFICANT CHANGE UP (ref 150–400)
PLATELET COUNT - ESTIMATE: NORMAL — SIGNIFICANT CHANGE UP
PMV BLD: 9 FL — SIGNIFICANT CHANGE UP (ref 7–13)
POTASSIUM SERPL-MCNC: 4 MMOL/L — SIGNIFICANT CHANGE UP (ref 3.5–5.3)
POTASSIUM SERPL-SCNC: 4 MMOL/L — SIGNIFICANT CHANGE UP (ref 3.5–5.3)
PROT SERPL-MCNC: 8.6 G/DL — HIGH (ref 6–8.3)
RBC # BLD: 4.56 M/UL — SIGNIFICANT CHANGE UP (ref 3.8–5.2)
RBC # FLD: 13.4 % — SIGNIFICANT CHANGE UP (ref 10.3–14.5)
RBC BLD AUTO: NORMAL — SIGNIFICANT CHANGE UP
SODIUM SERPL-SCNC: 138 MMOL/L — SIGNIFICANT CHANGE UP (ref 135–145)
TROPONIN T, HIGH SENSITIVITY RESULT: <6 NG/L — SIGNIFICANT CHANGE UP
WBC # BLD: 6.3 K/UL — SIGNIFICANT CHANGE UP (ref 3.8–10.5)
WBC # FLD AUTO: 6.3 K/UL — SIGNIFICANT CHANGE UP (ref 3.8–10.5)

## 2025-08-24 PROCEDURE — 93010 ELECTROCARDIOGRAM REPORT: CPT

## 2025-08-24 PROCEDURE — 71045 X-RAY EXAM CHEST 1 VIEW: CPT | Mod: 26

## 2025-08-24 PROCEDURE — 99285 EMERGENCY DEPT VISIT HI MDM: CPT
